# Patient Record
Sex: MALE | Race: WHITE | Employment: FULL TIME | URBAN - METROPOLITAN AREA
[De-identification: names, ages, dates, MRNs, and addresses within clinical notes are randomized per-mention and may not be internally consistent; named-entity substitution may affect disease eponyms.]

---

## 2018-04-16 ENCOUNTER — OFFICE VISIT (OUTPATIENT)
Dept: FAMILY MEDICINE CLINIC | Age: 33
End: 2018-04-16

## 2018-04-16 VITALS
DIASTOLIC BLOOD PRESSURE: 76 MMHG | SYSTOLIC BLOOD PRESSURE: 124 MMHG | WEIGHT: 315 LBS | HEIGHT: 70 IN | BODY MASS INDEX: 45.1 KG/M2 | OXYGEN SATURATION: 99 % | HEART RATE: 89 BPM

## 2018-04-16 DIAGNOSIS — R07.9 CHEST PAIN, UNSPECIFIED TYPE: ICD-10-CM

## 2018-04-16 DIAGNOSIS — Z72.0 TOBACCO ABUSE: Primary | ICD-10-CM

## 2018-04-16 PROCEDURE — 93000 ELECTROCARDIOGRAM COMPLETE: CPT | Performed by: NURSE PRACTITIONER

## 2018-04-16 PROCEDURE — 99203 OFFICE O/P NEW LOW 30 MIN: CPT | Performed by: NURSE PRACTITIONER

## 2018-04-16 ASSESSMENT — ENCOUNTER SYMPTOMS
WHEEZING: 0
SHORTNESS OF BREATH: 0

## 2018-04-18 ENCOUNTER — OFFICE VISIT (OUTPATIENT)
Dept: FAMILY MEDICINE CLINIC | Age: 33
End: 2018-04-18

## 2018-04-18 VITALS
BODY MASS INDEX: 45.1 KG/M2 | WEIGHT: 315 LBS | TEMPERATURE: 99 F | SYSTOLIC BLOOD PRESSURE: 118 MMHG | HEIGHT: 70 IN | DIASTOLIC BLOOD PRESSURE: 80 MMHG | HEART RATE: 80 BPM

## 2018-04-18 DIAGNOSIS — Z00.00 PHYSICAL EXAM: Primary | ICD-10-CM

## 2018-04-18 LAB
A/G RATIO: 1.4 (ref 1.1–2.2)
ALBUMIN SERPL-MCNC: 4.2 G/DL (ref 3.4–5)
ALP BLD-CCNC: 129 U/L (ref 40–129)
ALT SERPL-CCNC: 27 U/L (ref 10–40)
ANION GAP SERPL CALCULATED.3IONS-SCNC: 15 MMOL/L (ref 3–16)
AST SERPL-CCNC: 18 U/L (ref 15–37)
BILIRUB SERPL-MCNC: <0.2 MG/DL (ref 0–1)
BUN BLDV-MCNC: 14 MG/DL (ref 7–20)
CALCIUM SERPL-MCNC: 9.2 MG/DL (ref 8.3–10.6)
CHLORIDE BLD-SCNC: 102 MMOL/L (ref 99–110)
CHOLESTEROL, TOTAL: 221 MG/DL (ref 0–199)
CO2: 24 MMOL/L (ref 21–32)
CREAT SERPL-MCNC: 0.5 MG/DL (ref 0.9–1.3)
GFR AFRICAN AMERICAN: >60
GFR NON-AFRICAN AMERICAN: >60
GLOBULIN: 3.1 G/DL
GLUCOSE BLD-MCNC: 104 MG/DL (ref 70–99)
HDLC SERPL-MCNC: 58 MG/DL (ref 40–60)
LDL CHOLESTEROL CALCULATED: 142 MG/DL
POTASSIUM SERPL-SCNC: 4.6 MMOL/L (ref 3.5–5.1)
SODIUM BLD-SCNC: 141 MMOL/L (ref 136–145)
TOTAL PROTEIN: 7.3 G/DL (ref 6.4–8.2)
TRIGL SERPL-MCNC: 107 MG/DL (ref 0–150)
VLDLC SERPL CALC-MCNC: 21 MG/DL

## 2018-04-18 PROCEDURE — 36415 COLL VENOUS BLD VENIPUNCTURE: CPT | Performed by: NURSE PRACTITIONER

## 2018-04-18 PROCEDURE — 99395 PREV VISIT EST AGE 18-39: CPT | Performed by: NURSE PRACTITIONER

## 2018-04-18 ASSESSMENT — ENCOUNTER SYMPTOMS
DIARRHEA: 0
BLURRED VISION: 0
SHORTNESS OF BREATH: 0
ABDOMINAL PAIN: 0
WHEEZING: 0
EYE REDNESS: 0
CONSTIPATION: 0
COUGH: 0
SORE THROAT: 0

## 2018-04-19 LAB
ESTIMATED AVERAGE GLUCOSE: 108.3 MG/DL
HBA1C MFR BLD: 5.4 %

## 2020-04-29 ENCOUNTER — VIRTUAL VISIT (OUTPATIENT)
Dept: PSYCHOLOGY | Age: 35
End: 2020-04-29
Payer: COMMERCIAL

## 2020-04-29 PROCEDURE — 90791 PSYCH DIAGNOSTIC EVALUATION: CPT | Performed by: PSYCHOLOGIST

## 2020-04-29 NOTE — PROGRESS NOTES
contact's name and number, as well as permission to contact them if needed: Extended Emergency Contact Information  Primary Emergency Contact: Mike-Grade-Allee 19 of [address] [place] [address] Phone: [phone]  Relation: Spouse     Provider location: [address] [place] [address], [address] [place] [address]:  Patient presents with concerns about attention. Sx have been present for most of his adult life. Pt complains of these symptoms of ADHD: fails to give close attention to details or makes careless mistakes in school, work, or other activities, has difficulty sustaining attention in tasks or play activities, does not seem to listen when spoken to directly, has difficulty organizing tasks and activities, does not follow through on instructions and fails to finish schoolwork, chores, or duties in the workplace, loses things that are necessary for tasks and activities, is easily distracted by extraneous stimuli and is often forgetful in daily activities. He describes being distracted by racing thoughts, often worried thoughts about what happened that day, what needs to be done the next day, etc. He has had feedback from his wife and colleagues about his attention and forgetfulness. Sx are aggravated by situational stress. A few years ago, patient's dad passed away suddenly. He lost 5 or 6 other loved ones that year, then his son was born 2 months early and was dx with TTOF. His wife will be home with their son until he goes to , so patient is now the only earner in the family. He is insightful about how these stressors have affected him. Patient finds relief from listening to music or podcasts while at work, using [employer], keeping his hands occupied. Patient lives with his wife of 2 years and their 3year-old son. They have a puppy. Patient works full-time in sales or an insurance company. Daily routine is fairly consistent. Daily caffeine use includes 2-3 cans of Mt. Dew.  Smokes up to 1 ppd (is interested in quitting), drinks

## 2020-05-04 ENCOUNTER — VIRTUAL VISIT (OUTPATIENT)
Dept: PSYCHOLOGY | Age: 35
End: 2020-05-04
Payer: COMMERCIAL

## 2020-05-04 PROCEDURE — 90832 PSYTX W PT 30 MINUTES: CPT | Performed by: PSYCHOLOGIST

## 2020-05-04 NOTE — PROGRESS NOTES
Behavioral Health Consultation  Ranjeet Roque, Ph.D.  Psychologist  5/5/2020  4:12 PM EDT      Time spent with Patient: 25 minutes  This is patient's second [facility] appointment. Reason for Consult:    Chief Complaint   Patient presents with    ADHD    Anxiety     Referring Provider: Jess Vaughn, APRN - CNP  [address] [facility] [address], [address]    Feedback given to PCP. TELEHEALTH VISIT -- Audio/Visual (During MCSOE-60 public health emergency)  }  Pursuant to the emergency declaration under the [facility] waiver authority and the [employer] Act, this Virtual Visit was conducted, with patient's consent, to reduce the patient's risk of exposure to COVID-19 and provide continuity of care for an established patient. Services were provided through a video synchronous discussion virtually to substitute for in-person clinic visit. Pt gave verbal informed consent to participate in telehealth services. Conducted a risk-benefit analysis and determined that the patient's presenting problems are consistent with the use of telepsychology. Determined that the patient has sufficient knowledge and skills in the use of technology enabling them to adequately benefit from telepsychology. It was determined that this patient was able to be properly treated without an in-person session. Patient verified that they were currently located at the [facility] address that was provided during registration.     Verified the following information:  Patient's identification: Yes  Patient location: [address] [place] [address] [address]   Patient's call back number: [phone]   Patient's emergency contact's name and number, as well as permission to contact them if needed: Extended Emergency Contact Information  Primary Emergency Contact: Mike-Grade-Allee 18 of [address] [place] [address] Phone: [phone]  Relation: Spouse worse since his dad . Discussed recommendation to start Wellbutrin. Patient agreeable. Discussed with PCP. O:  MSE:    Appearance: good hygiene   Attitude: cooperative and friendly  Consciousness: alert  Orientation: oriented to person, place, time, general circumstance  Memory: recent and remote memory intact  Attention/Concentration: intact during session  Psychomotor Activity:normal  Eye Contact: normal  Speech: normal rate and volume, well-articulated  Mood: euthymic  Affect: euthymic  Perception: within normal limits  Thought Content: within normal limits  Thought Process: logical, coherent and goal-directed  Insight: good  Judgment: intact  Ability to understand instructions: Yes  Ability to respond meaningfully: Yes  Morbid Ideation: no   Suicide Assessment: no suicidal ideation, plan, or intent  Homicidal Ideation: no    History:    Medications:   No current outpatient medications on file. No current facility-administered medications for this visit.       Social History:   Social History     Socioeconomic History    Marital status:      Spouse name: Not on file    Number of children: Not on file    Years of education: Not on file    Highest education level: Not on file   Occupational History    Not on file   Social Needs    Financial resource strain: Not on file    Food insecurity     Worry: Not on file     Inability: Not on file    Transportation needs     Medical: Not on file     Non-medical: Not on file   Tobacco Use    Smoking status: Current Every Day Smoker     Packs/day: 1.00     Types: Cigarettes    Smokeless tobacco: Never Used   Substance and Sexual Activity    Alcohol use: Yes     Comment: OCCASIONALLY    Drug use: No    Sexual activity: Yes     Partners: Female   Lifestyle    Physical activity     Days per week: Not on file     Minutes per session: Not on file    Stress: Not on file   Relationships    Social connections     Talks on phone: Not on file     Gets together: Not on file     Attends Bahai service: Not on file     Active member of club or organization: Not on file     Attends meetings of clubs or organizations: Not on file     Relationship status: Not on file    Intimate partner violence     Fear of current or ex partner: Not on file     Emotionally abused: Not on file     Physically abused: Not on file     Forced sexual activity: Not on file   Other Topics Concern    Not on file   Social History Narrative    Not on file     TOBACCO:   reports that he has been smoking cigarettes. He has been smoking about 1.00 pack per day. He has never used smokeless tobacco.  ETOH:   reports current alcohol use. Family History:   Family History   Problem Relation Age of Onset    Diabetes Mother     High Blood Pressure Mother     Heart Attack Father     Mental Illness Father     Diabetes Father     Bipolar Disorder Sister     Heart Attack Maternal Grandmother     Heart Attack Maternal Grandfather     Cancer Maternal Grandfather         pancreatic    Stroke Paternal Grandmother     Stroke Paternal Grandfather     Kidney Disease Paternal Grandfather      A:  Patient engaged and cooperative. Denies SI. Insight and motivation are good. Diagnosis:    1. ADHD (attention deficit hyperactivity disorder), inattentive type    2. Anxiety disorder, unspecified type    3. Depression, unspecified depression type      No past medical history on file. Plan:  Pt interventions:  Provided education on the use of medication to treat  depression, stress and ADHD, Conducted functional assessment, [place]-setting to identify pt's primary goals for [facility] visit / overall health, Supportive techniques and Emphasized self-care as important for managing overall health.     Pt Behavioral Change Plan:   See Pt Instructions

## 2020-05-05 ENCOUNTER — TELEPHONE (OUTPATIENT)
Dept: PSYCHOLOGY | Age: 35
End: 2020-05-05

## 2020-05-05 RX ORDER — BUPROPION HYDROCHLORIDE 150 MG/1
150 TABLET ORAL EVERY MORNING
Qty: 30 TABLET | Refills: 3 | Status: SHIPPED | OUTPATIENT
Start: 2020-05-05 | End: 2020-06-04 | Stop reason: ALTCHOICE

## 2020-05-18 ENCOUNTER — VIRTUAL VISIT (OUTPATIENT)
Dept: PSYCHOLOGY | Age: 35
End: 2020-05-18
Payer: COMMERCIAL

## 2020-05-18 PROCEDURE — 90832 PSYTX W PT 30 MINUTES: CPT | Performed by: PSYCHOLOGIST

## 2020-05-18 NOTE — PROGRESS NOTES
Behavioral Health Consultation  Shawn Lassiter, Ph.D.  Psychologist  5/18/2020  3:35 PM EDT      Time spent with Patient: 25 minutes  This is patient's third [facility] appointment. Reason for Consult:    Chief Complaint   Patient presents with    Anxiety    Depression     Referring Provider: KRAIG Shaikh - CNP  [address] [facility] [address], [address]    Feedback given to PCP. TELEHEALTH VISIT -- Audio/Visual (During XEARE-82 public health emergency)  }  Pursuant to the emergency declaration under the [facility], [facility] waiver authority and the [employer] Act, this Virtual Visit was conducted, with patient's consent, to reduce the patient's risk of exposure to COVID-19 and provide continuity of care for an established patient. Services were provided through a video synchronous discussion virtually to substitute for in-person clinic visit. Pt gave verbal informed consent to participate in telehealth services. Conducted a risk-benefit analysis and determined that the patient's presenting problems are consistent with the use of telepsychology. Determined that the patient has sufficient knowledge and skills in the use of technology enabling them to adequately benefit from telepsychology. It was determined that this patient was able to be properly treated without an in-person session. Patient verified that they were currently located at the [facility] address that was provided during registration.     Verified the following information:  Patient's identification: Yes  Patient location: [address] [place] [address]   Patient's call back number: [phone]   Patient's emergency contact's name and number, as well as permission to contact them if needed: Extended Emergency Contact Information  Primary Emergency Contact: Mike-Grade-Allee 18 of [address] [place] [address] Phone: [phone]  Relation: Spouse Medical: Not on file     Non-medical: Not on file   Tobacco Use    Smoking status: Current Every Day Smoker     Packs/day: 1.00     Types: Cigarettes    Smokeless tobacco: Never Used   Substance and Sexual Activity    Alcohol use: Yes     Comment: OCCASIONALLY    Drug use: No    Sexual activity: Yes     Partners: Female   Lifestyle    Physical activity     Days per week: Not on file     Minutes per session: Not on file    Stress: Not on file   Relationships    Social connections     Talks on phone: Not on file     Gets together: Not on file     Attends Congregational service: Not on file     Active member of club or organization: Not on file     Attends meetings of clubs or organizations: Not on file     Relationship status: Not on file    Intimate partner violence     Fear of current or ex partner: Not on file     Emotionally abused: Not on file     Physically abused: Not on file     Forced sexual activity: Not on file   Other Topics Concern    Not on file   Social History Narrative    Not on file     TOBACCO:   reports that he has been smoking cigarettes. He has been smoking about 1.00 pack per day. He has never used smokeless tobacco.  ETOH:   reports current alcohol use. Family History:   Family History   Problem Relation Age of Onset    Diabetes Mother     High Blood Pressure Mother     Heart Attack Father     Mental Illness Father     Diabetes Father     Bipolar Disorder Sister     Heart Attack Maternal Grandmother     Heart Attack Maternal Grandfather     Cancer Maternal Grandfather         pancreatic    Stroke Paternal Grandmother     Stroke Paternal Grandfather     Kidney Disease Paternal Grandfather      A:  Patient engaged and cooperative. Denies SI. Insight and motivation are good. Diagnosis:    1. Anxiety disorder, unspecified type    2. Depression, unspecified depression type    3.  ADHD (attention deficit hyperactivity disorder), inattentive type      No past medical history on file. Plan:  Pt interventions:  Conducted functional assessment, [place]-setting to identify pt's primary goals for [facility] visit / overall health, Supportive techniques, Emphasized self-care as important for managing overall health and Provided Psychoeducation re: mindfulness.     Pt Behavioral Change Plan:   See Pt Instructions

## 2020-06-01 ENCOUNTER — VIRTUAL VISIT (OUTPATIENT)
Dept: PSYCHOLOGY | Age: 35
End: 2020-06-01
Payer: COMMERCIAL

## 2020-06-01 PROCEDURE — 90832 PSYTX W PT 30 MINUTES: CPT | Performed by: PSYCHOLOGIST

## 2020-06-01 NOTE — PROGRESS NOTES
Behavioral Health Consultation  Rhoda Rausch, Ph.D.  Psychologist  6/1/2020  3:44 PM EDT      Time spent with Patient: 25 minutes  This is patient's fourth [facility] appointment. Reason for Consult:    Chief Complaint   Patient presents with    Anxiety    ADHD     Referring Provider: Alan Gasca, KRAIG - CNP  [address] [facility] [address], [address]    Feedback given to PCP. TELEHEALTH VISIT -- Audio/Visual (During DBGTN-33 public health emergency)  }  Pursuant to the emergency declaration under the [facility], [facility] waiver authority and the [employer] and [employer] Act, this Virtual Visit was conducted, with patient's consent, to reduce the patient's risk of exposure to COVID-19 and provide continuity of care for an established patient. Services were provided through a video synchronous discussion virtually to substitute for in-person clinic visit. Pt gave verbal informed consent to participate in telehealth services. Conducted a risk-benefit analysis and determined that the patient's presenting problems are consistent with the use of telepsychology. Determined that the patient has sufficient knowledge and skills in the use of technology enabling them to adequately benefit from telepsychology. It was determined that this patient was able to be properly treated without an in-person session. Patient verified that they were currently located at the [facility] address that was provided during registration.     Verified the following information:  Patient's identification: Yes  Patient location: [address] [place] [address]   Patient's call back number: [phone]   Patient's emergency contact's name and number, as well as permission to contact them if needed: Extended Emergency Contact Information  Primary Emergency Contact: Mike-Grade-Allee [address] of [address] [place] [address] Phone: [phone]  Relation: Spouse Provider location: [address]:  Patient reported that he has continued smoking less cigarettes overall. Smokes just over 1/2 ppd, down from 1 ppd prior to starting Wellbutrin. However, he notices feeling that his anxiety is somewhat worse since last visit. Assessed possibility that stressors have increased, or that depression lessening has highlighted residual anxiety. He notes that his negative thinking has lessened, but he is more irritable. He is also having increased fidgeting and racing thoughts. [facility] will discuss with PCP. O:  MSE:    Appearance: good hygiene   Attitude: cooperative and friendly  Consciousness: alert  Orientation: oriented to person, place, time, general circumstance  Memory: recent and remote memory intact  Attention/Concentration: intact during session  Psychomotor Activity:normal  Eye Contact: normal  Speech: normal rate and volume, well-articulated  Mood: anxious  Affect: anxious  Perception: within normal limits  Thought Content: intrusive thoughts  Thought Process: logical, coherent and goal-directed  Insight: good  Judgment: intact  Ability to understand instructions: Yes  Ability to respond meaningfully: Yes  Morbid Ideation: no   Suicide Assessment: no suicidal ideation, plan, or intent  Homicidal Ideation: no    History:    Medications:   Current Outpatient Medications   Medication Sig Dispense Refill    buPROPion (WELLBUTRIN XL) 150 MG extended release tablet Take 1 tablet by mouth every morning 30 tablet 3     No current facility-administered medications for this visit.       Social History:   Social History     Socioeconomic History    Marital status:      Spouse name: Not on file    Number of children: Not on file    Years of education: Not on file    Highest education level: Not on file   Occupational History    Not on file   Social Needs    Financial resource strain: Not on file    Food insecurity     Worry: Not on file     Inability: Not on file

## 2020-06-02 ENCOUNTER — TELEPHONE (OUTPATIENT)
Dept: PSYCHOLOGY | Age: 35
End: 2020-06-02

## 2020-06-02 NOTE — TELEPHONE ENCOUNTER
Patient notes increased anxiety since starting Wellbutrin. He is struggling with concentration the most. Please let me or him know if you want an appointment to discuss options or if you want to try something else (Strattera or Vyvanse? Or Paxil?).

## 2020-06-04 ENCOUNTER — TELEMEDICINE (OUTPATIENT)
Dept: FAMILY MEDICINE CLINIC | Age: 35
End: 2020-06-04
Payer: COMMERCIAL

## 2020-06-04 PROCEDURE — 99213 OFFICE O/P EST LOW 20 MIN: CPT | Performed by: NURSE PRACTITIONER

## 2020-06-04 ASSESSMENT — ENCOUNTER SYMPTOMS
COUGH: 0
WHEEZING: 0
SHORTNESS OF BREATH: 0

## 2020-06-04 NOTE — PROGRESS NOTES
Temperature-  Pulse oximetry-     Constitutional: [x] Appears well-developed and well-nourished [x] No apparent distress      [] Abnormal-   Mental status  [x] Alert and awake  [x] Oriented to person/place/time [x]Able to follow commands      Eyes:  EOM    []  Normal  [] Abnormal-  Sclera  [x]  Normal  [] Abnormal -         Discharge [x]  None visible  [] Abnormal -    HENT:   [x] Normocephalic, atraumatic. [] Abnormal   [x] Mouth/Throat: Mucous membranes are moist.     External Ears [x] Normal  [] Abnormal-     Neck: [x] No visualized mass     Pulmonary/Chest: [x] Respiratory effort normal.  [x] No visualized signs of difficulty breathing or respiratory distress        [] Abnormal-      Musculoskeletal:   [] Normal gait with no signs of ataxia         [x] Normal range of motion of neck        [] Abnormal-       Neurological:        [] No Facial Asymmetry (Cranial nerve 7 motor function) (limited exam to video visit)          [x] No gaze palsy        [] Abnormal-         Skin:        [x] No significant exanthematous lesions or discoloration noted on facial skin         [] Abnormal-            Psychiatric:       [x] Normal Affect [x] No Hallucinations        [] Abnormal-     Other pertinent observable physical exam findings-     ASSESSMENT/PLAN:  1. Attention deficit hyperactivity disorder (ADHD), unspecified ADHD type    - lisdexamfetamine (VYVANSE) 30 MG capsule; Take 1 capsule by mouth every morning for 30 days. Dispense: 30 capsule; Refill: 0  Continue to see Dr. Sharif Esquivel for mood      Return in about 1 month (around 7/4/2020) for f/u ADHD. Vincent Mireles is a 36 y.o. male being evaluated by a Virtual Visit (video visit) encounter to address concerns as mentioned above. A caregiver was present when appropriate.  Due to this being a TeleHealth encounter (During GEQSX-43 public health emergency), evaluation of the following organ systems was limited:

## 2020-06-05 ENCOUNTER — TELEPHONE (OUTPATIENT)
Dept: FAMILY MEDICINE CLINIC | Age: 35
End: 2020-06-05

## 2020-06-06 RX ORDER — METHYLPHENIDATE HYDROCHLORIDE 20 MG/1
20 CAPSULE, EXTENDED RELEASE ORAL EVERY MORNING
Qty: 30 CAPSULE | Refills: 0 | Status: SHIPPED | OUTPATIENT
Start: 2020-06-06 | End: 2020-06-08

## 2020-06-08 ENCOUNTER — TELEPHONE (OUTPATIENT)
Dept: FAMILY MEDICINE CLINIC | Age: 35
End: 2020-06-08

## 2020-06-09 ENCOUNTER — TELEPHONE (OUTPATIENT)
Dept: FAMILY MEDICINE CLINIC | Age: 35
End: 2020-06-09

## 2020-06-15 ENCOUNTER — VIRTUAL VISIT (OUTPATIENT)
Dept: PSYCHOLOGY | Age: 35
End: 2020-06-15
Payer: COMMERCIAL

## 2020-06-15 PROCEDURE — 90832 PSYTX W PT 30 MINUTES: CPT | Performed by: PSYCHOLOGIST

## 2020-06-15 NOTE — PROGRESS NOTES
Provider location: [place]:  Patient notes significant improvement in his focus and memory since starting Vyvanse last week. He notes improvement at work, getting more done, not missing as many details. Wife notes that he is more \"present,\" less forgetful. Timothy Fields has commented on improved note taking at work. Doses at 9:30 am, with breakfast including iced coffee  Wears off around 6:00, notices difficulty concentrating and headache (relieved with Excedrin)  Eats dinner around 7:30  Reduced appetite  Sleep is not disturbed. Drinking less caffeine  Smoking slightly less cigarettes (1/4 less PPD)  Easier to participate in grounding exercises     Patient did use timers to help him remember to look back over his notes. Overall, he is doing well. Recommended scheduling a snack in the afternoon to help with PM headaches. Will f/u in 1 month. O:  MSE:    Appearance: good hygiene   Attitude: cooperative and friendly  Consciousness: alert  Orientation: oriented to person, place, time, general circumstance  Memory: recent and remote memory intact  Attention/Concentration: intact during session  Psychomotor Activity:normal  Eye Contact: normal  Speech: normal rate and volume, well-articulated  Mood: good  Affect: euthymic  Perception: within normal limits  Thought Content: within normal limits  Thought Process: logical, coherent and goal-directed  Insight: good  Judgment: intact  Ability to understand instructions: Yes  Ability to respond meaningfully: Yes  Morbid Ideation: no   Suicide Assessment: no suicidal ideation, plan, or intent  Homicidal Ideation: no    History:    Medications:   Current Outpatient Medications   Medication Sig Dispense Refill    lisdexamfetamine (VYVANSE) 30 MG capsule Take 1 capsule by mouth every morning for 30 days. 30 capsule 0     No current facility-administered medications for this visit.       Social History:   Social History     Socioeconomic History    Marital Paternal Grandfather     Kidney Disease Paternal Grandfather      A:  Patient engaged and cooperative. Denies SI. Insight and motivation are good. Diagnosis:    1. Depression, unspecified depression type    2. Anxiety disorder, unspecified type    3. ADHD (attention deficit hyperactivity disorder), inattentive type          Diagnosis Date    ADHD      Plan:  Pt interventions:  Conducted functional assessment, [place]-setting to identify pt's primary goals for [facility] visit / overall health, Supportive techniques, Emphasized self-care as important for managing overall health and Problem-solving re: new stimulant.     Pt Behavioral Change Plan:   See Pt Instructions

## 2020-06-15 NOTE — PATIENT INSTRUCTIONS
1. Schedule a snack in the afternoon. Consider a high-protein snack that is easily portable. Look for protein bars that don't have too much sugar, or fill snack bags with nuts or trail mix. Have a glass of water with your snack. 2. Keep up the good work, and I'll see you in 1 month!

## 2020-07-02 ENCOUNTER — TELEMEDICINE (OUTPATIENT)
Dept: FAMILY MEDICINE CLINIC | Age: 35
End: 2020-07-02
Payer: COMMERCIAL

## 2020-07-02 PROCEDURE — 99212 OFFICE O/P EST SF 10 MIN: CPT | Performed by: NURSE PRACTITIONER

## 2020-07-02 ASSESSMENT — ENCOUNTER SYMPTOMS
WHEEZING: 0
SHORTNESS OF BREATH: 0

## 2020-07-02 NOTE — PROGRESS NOTES
2020    TELEHEALTH EVALUATION -- Audio/Visual (During QXTWN-95 public health emergency)    HPI:    Maximus Farr (:  1985) has requested an audio/video evaluation for the following concern(s):    ADHD: Started vyvanse 30mg a month ago- felt like it worked a little the first couple weeks but now does not seem to help much- would like to try higher dose. Denies any trouble with heart palpitations or sleeping. Has affected appetite a little but patient could lose some weight as well. Review of Systems   Constitutional: Negative for activity change, appetite change and unexpected weight change. Respiratory: Negative for shortness of breath and wheezing. Cardiovascular: Negative for chest pain and palpitations. Neurological: Negative for dizziness and headaches. Psychiatric/Behavioral: Positive for decreased concentration. Prior to Visit Medications    Medication Sig Taking? Authorizing Provider   lisdexamfetamine (VYVANSE) 50 MG capsule Take 1 capsule by mouth every morning for 30 days.  Yes Silviano Donahue APRN - CNP       Social History     Tobacco Use    Smoking status: Current Every Day Smoker     Packs/day: 1.00     Types: Cigarettes    Smokeless tobacco: Never Used   Substance Use Topics    Alcohol use: Yes     Comment: OCCASIONALLY    Drug use: No        No Known Allergies,   Past Medical History:   Diagnosis Date    ADHD    ,   Past Surgical History:   Procedure Laterality Date    APPENDECTOMY      TYMPANOSTOMY TUBE PLACEMENT     ,   Family History   Problem Relation Age of Onset    Diabetes Mother     High Blood Pressure Mother     Heart Attack Father     Mental Illness Father     Diabetes Father     Bipolar Disorder Sister     Heart Attack Maternal Grandmother     Heart Attack Maternal Grandfather     Cancer Maternal Grandfather         pancreatic    Stroke Paternal Grandmother     Stroke Paternal Grandfather     Kidney Disease Paternal Grandfather        PHYSICAL EXAMINATION:  [ INSTRUCTIONS:  \"[x]\" Indicates a positive item  \"[]\" Indicates a negative item  -- DELETE ALL ITEMS NOT EXAMINED]  Vital Signs: (As obtained by patient/caregiver or practitioner observation)    Blood pressure-  Heart rate-    Respiratory rate-    Temperature-  Pulse oximetry-     Constitutional: [x] Appears well-developed and well-nourished [x] No apparent distress      [] Abnormal-   Mental status  [x] Alert and awake  [x] Oriented to person/place/time [x]Able to follow commands      Eyes:  EOM    []  Normal  [] Abnormal-  Sclera  [x]  Normal  [] Abnormal -         Discharge [x]  None visible  [] Abnormal -    HENT:   [x] Normocephalic, atraumatic. [] Abnormal   [x] Mouth/Throat: Mucous membranes are moist.     External Ears [x] Normal  [] Abnormal-     Neck: [x] No visualized mass     Pulmonary/Chest: [x] Respiratory effort normal.  [x] No visualized signs of difficulty breathing or respiratory distress        [] Abnormal-      Musculoskeletal:   [] Normal gait with no signs of ataxia         [x] Normal range of motion of neck        [] Abnormal-       Neurological:        [x] No Facial Asymmetry (Cranial nerve 7 motor function) (limited exam to video visit)          [x] No gaze palsy        [] Abnormal-         Skin:        [x] No significant exanthematous lesions or discoloration noted on facial skin         [] Abnormal-            Psychiatric:       [x] Normal Affect [x] No Hallucinations        [] Abnormal-     Other pertinent observable physical exam findings-     ASSESSMENT/PLAN:  1. Attention deficit hyperactivity disorder (ADHD), unspecified ADHD type  Increased dose  - lisdexamfetamine (VYVANSE) 50 MG capsule; Take 1 capsule by mouth every morning for 30 days. Dispense: 30 capsule; Refill: 0      Return in about 1 month (around 8/2/2020) for f/u ADHD.     Filomena Payne is a 36 y.o. male being evaluated by a Virtual Visit (video visit) encounter to address concerns as mentioned above. A caregiver was present when appropriate. Due to this being a TeleHealth encounter (During DVUDM-65 public health emergency), evaluation of the following organ systems was limited: Vitals/Constitutional/EENT/Resp/CV/GI//MS/Neuro/Skin/Heme-Lymph-Imm. Pursuant to the emergency declaration under the [address] [place] [address], [address] [place] [address] authority and the [employer] and [employer] Act, this Virtual Visit was conducted with patient's (and/or legal guardian's) consent, to reduce the patient's risk of exposure to COVID-19 and provide necessary medical care. The patient (and/or legal guardian) has also been advised to contact this office for worsening conditions or problems, and seek emergency medical treatment and/or call 911 if deemed necessary. Patient identification was verified at the start of the visit: Yes    Total time spent on this encounter: Not billed by time    Services were provided through a video synchronous discussion virtually to substitute for in-person clinic visit. Patient and provider were located at their individual homes. --KRAIG Luna - CNP on 7/2/2020 at 3:18 PM    An electronic signature was used to authenticate this note.

## 2020-07-13 ENCOUNTER — VIRTUAL VISIT (OUTPATIENT)
Dept: PSYCHOLOGY | Age: 35
End: 2020-07-13
Payer: COMMERCIAL

## 2020-07-13 PROCEDURE — 90832 PSYTX W PT 30 MINUTES: CPT | Performed by: PSYCHOLOGIST

## 2020-07-13 NOTE — PATIENT INSTRUCTIONS
Time-Out Guidelines for Couples    1. Either partner can call a time-out during a discussion utilizing a pre-agreed upon signal.    2. Use an I statement to let your partner know that you want to stop the discussion. 3. The other partner agrees to respect the cue that the discussion is escalating. 4. A time-out needs to last long enough for each partner to calm sufficiently. Many couples have discovered that an hour works well. 5. During the time-out, both partners agree to utilize calming techniques. 6. The partner who initiated the time-out should reinitiate the discussion upon returning with both agreeing to use safer more effective ways to communicate. 7. If either partner is not ready to return to the discussion after the time-out, he/she can decline politely. 8. If declining, the partner must agree to a time within 24 hours that he/she will return to the discussion.

## 2020-07-13 NOTE — PROGRESS NOTES
Behavioral Health Consultation  Noah Talavera, Ph.D.  Psychologist  7/13/2020  3:38 PM EDT      Time spent with Patient: 25 minutes  This is patient's sixth [facility] appointment. Reason for Consult:    Chief Complaint   Patient presents with    ADHD    Anxiety     Referring Provider: KRAIG De Paz - PHONG  [address] [facility] [address], [address]    Feedback given to PCP. TELEHEALTH VISIT -- Audio/Visual (During GCOYX-56 public health emergency)  }  Pursuant to the emergency declaration under the [facility], [facility] waiver authority and the [employer] and [employer] Act, this Virtual Visit was conducted, with patient's consent, to reduce the patient's risk of exposure to COVID-19 and provide continuity of care for an established patient. Services were provided through a video synchronous discussion virtually to substitute for in-person clinic visit. Pt gave verbal informed consent to participate in telehealth services. Conducted a risk-benefit analysis and determined that the patient's presenting problems are consistent with the use of telepsychology. Determined that the patient has sufficient knowledge and skills in the use of technology enabling them to adequately benefit from telepsychology. It was determined that this patient was able to be properly treated without an in-person session. Patient verified that they were currently located at the [facility] address that was provided during registration.     Verified the following information:  Patient's identification: Yes  Patient location: [address] [place] [address]   Patient's call back number: [phone]   Patient's emergency contact's name and number, as well as permission to contact them if needed: Extended Emergency Contact Information  Primary Emergency Contact: Mike-Grade-Allee [address] of [address] [place] [address] Phone: [phone]  Relation: Spouse Provider location: [place]wyn Layman:  Patient continues to note improvement in his focus that he attributes to Vyvanse. Denies problematic side effects. Does note decreased appetite, but has been eating 3 times a day. Notes having one of his best months at work in a long time. Also feels able to calm himself down when stressful. He feels he has started processing his father's death more. A good friend passed away a few weeks ago, which was hard for him. Discussed his questions about communicating, especially when emotional. Encouraged continued use of mindfulness strategies and reviewed communication time out. O:  MSE:    Appearance: good hygiene   Attitude: cooperative and friendly  Consciousness: alert  Orientation: oriented to person, place, time, general circumstance  Memory: recent and remote memory intact  Attention/Concentration: intact during session  Psychomotor Activity:normal  Eye Contact: normal  Speech: normal rate and volume, well-articulated  Mood: good  Affect: euthymic  Perception: within normal limits  Thought Content: all-or-none thinking and intrusive thoughts  Thought Process: logical, coherent and goal-directed  Insight: good  Judgment: intact  Ability to understand instructions: Yes  Ability to respond meaningfully: Yes  Morbid Ideation: no   Suicide Assessment: no suicidal ideation, plan, or intent  Homicidal Ideation: no    History:    Medications:   Current Outpatient Medications   Medication Sig Dispense Refill    lisdexamfetamine (VYVANSE) 50 MG capsule Take 1 capsule by mouth every morning for 30 days. 30 capsule 0     No current facility-administered medications for this visit.       Social History:   Social History     Socioeconomic History    Marital status:      Spouse name: Not on file    Number of children: Not on file    Years of education: Not on file    Highest education level: Not on file   Occupational History    Not on file   Social Needs    Financial disorder, unspecified type    3. ADHD (attention deficit hyperactivity disorder), inattentive type          Diagnosis Date    ADHD      Plan:  Pt interventions:  Trained in improving communication skills, Conducted functional assessment, [place]-setting to identify pt's primary goals for [facility] visit / overall health, Supportive techniques, Emphasized self-care as important for managing overall health and Identified maladaptive thoughts.     Pt Behavioral Change Plan:   See Pt Instructions

## 2020-08-03 ENCOUNTER — VIRTUAL VISIT (OUTPATIENT)
Dept: FAMILY MEDICINE CLINIC | Age: 35
End: 2020-08-03
Payer: COMMERCIAL

## 2020-08-03 ENCOUNTER — VIRTUAL VISIT (OUTPATIENT)
Dept: PSYCHOLOGY | Age: 35
End: 2020-08-03
Payer: COMMERCIAL

## 2020-08-03 PROCEDURE — 90832 PSYTX W PT 30 MINUTES: CPT | Performed by: PSYCHOLOGIST

## 2020-08-03 PROCEDURE — 99212 OFFICE O/P EST SF 10 MIN: CPT | Performed by: NURSE PRACTITIONER

## 2020-08-03 ASSESSMENT — ENCOUNTER SYMPTOMS: SHORTNESS OF BREATH: 0

## 2020-08-03 NOTE — PROGRESS NOTES
Behavioral Health Consultation  Faizan Antony, Ph.D.  Psychologist  8/3/2020  4:03 PM EDT      Time spent with Patient: 20 minutes  This is patient's seventh [facility] appointment. Reason for Consult:    Chief Complaint   Patient presents with    ADHD    Anxiety     Referring Provider: Mikel López, APRN - CNP  [address] [facility] [address], [address]    Feedback given to PCP. TELEHEALTH VISIT -- Audio/Visual (During DVTNV-25 public health emergency)  }  Pursuant to the emergency declaration under the [facility], [facility] waiver authority and the [email or web address] and [employer] Act, this Virtual Visit was conducted, with patient's consent, to reduce the patient's risk of exposure to COVID-19 and provide continuity of care for an established patient. Services were provided through a video synchronous discussion virtually to substitute for in-person clinic visit. Pt gave verbal informed consent to participate in telehealth services. Conducted a risk-benefit analysis and determined that the patient's presenting problems are consistent with the use of telepsychology. Determined that the patient has sufficient knowledge and skills in the use of technology enabling them to adequately benefit from telepsychology. It was determined that this patient was able to be properly treated without an in-person session. Patient verified that they were currently located at the [facility] address that was provided during registration.     Verified the following information:  Patient's identification: Yes  Patient location: [address] [place] [address]   Patient's call back number: [phone]   Patient's emergency contact's name and number, as well as permission to contact them if needed: Extended Emergency Contact Information  Primary Emergency Contact: Mike-Grade-Allee [address] of [address] [place] [address] Phone: [phone]  Relation: Spouse Provider location: [address]:  Patient noted that his stress has been stable since last visit. Continues to experience benefit from Vyvanse, work is going very well. He has experienced a few instances of shorter temper, being more irritable with his wife. These often occur at the end of the day. Patient is insightful that he is also trying to communicate more with her in general, which increases opportunities to disagree. Discussed progress in treatment. Patient describes 75-80% improvement. \"I feel like things are heading in the right direction. \" Agreed to follow-up at monthly intervals for maintenance visits. O:  MSE:    Appearance: good hygiene   Attitude: cooperative and friendly  Consciousness: alert  Orientation: oriented to person, place, time, general circumstance  Memory: recent and remote memory intact  Attention/Concentration: intact during session  Psychomotor Activity:normal  Eye Contact: normal  Speech: normal rate and volume, well-articulated  Mood: \"busy\"  Affect: euthymic  Perception: within normal limits  Thought Content: intrusive thoughts  Thought Process: logical, coherent and goal-directed  Insight: good  Judgment: intact  Ability to understand instructions: Yes  Ability to respond meaningfully: Yes  Morbid Ideation: no   Suicide Assessment: no suicidal ideation, plan, or intent  Homicidal Ideation: no    History:    Medications:   Current Outpatient Medications   Medication Sig Dispense Refill    lisdexamfetamine (VYVANSE) 50 MG capsule Take 1 capsule by mouth every morning for 30 days. 30 capsule 0     No current facility-administered medications for this visit.       Social History:   Social History     Socioeconomic History    Marital status:      Spouse name: Not on file    Number of children: Not on file    Years of education: Not on file    Highest education level: Not on file   Occupational History    Not on file   Social Needs    Financial resource strain: Not on file    Food insecurity     Worry: Not on file     Inability: Not on file    Transportation needs     Medical: Not on file     Non-medical: Not on file   Tobacco Use    Smoking status: Current Every Day Smoker     Packs/day: 1.00     Types: Cigarettes    Smokeless tobacco: Never Used   Substance and Sexual Activity    Alcohol use: Yes     Comment: OCCASIONALLY    Drug use: No    Sexual activity: Yes     Partners: Female   Lifestyle    Physical activity     Days per week: Not on file     Minutes per session: Not on file    Stress: Not on file   Relationships    Social connections     Talks on phone: Not on file     Gets together: Not on file     Attends Holiness service: Not on file     Active member of club or organization: Not on file     Attends meetings of clubs or organizations: Not on file     Relationship status: Not on file    Intimate partner violence     Fear of current or ex partner: Not on file     Emotionally abused: Not on file     Physically abused: Not on file     Forced sexual activity: Not on file   Other Topics Concern    Not on file   Social History Narrative    Not on file     TOBACCO:   reports that he has been smoking cigarettes. He has been smoking about 1.00 pack per day. He has never used smokeless tobacco.  ETOH:   reports current alcohol use. Family History:   Family History   Problem Relation Age of Onset    Diabetes Mother     High Blood Pressure Mother     Heart Attack Father     Mental Illness Father     Diabetes Father     Bipolar Disorder Sister     Heart Attack Maternal Grandmother     Heart Attack Maternal Grandfather     Cancer Maternal Grandfather         pancreatic    Stroke Paternal Grandmother     Stroke Paternal Grandfather     Kidney Disease Paternal Grandfather      A:  Patient engaged and cooperative. Denies SI. Insight and motivation are good. Diagnosis:    1. Depression, unspecified depression type    2.  ADHD (attention deficit hyperactivity disorder), inattentive type    3. Anxiety disorder, unspecified type          Diagnosis Date    ADHD      Plan:  Pt interventions:  Trained in improving communication skills, Conducted functional assessment, [place]-setting to identify pt's primary goals for [facility] visit / overall health, Supportive techniques, Emphasized self-care as important for managing overall health and Collaboratively set goals with pt re: maintaining progress in treatment.     Pt Behavioral Change Plan:   See Pt Instructions

## 2020-08-03 NOTE — PROGRESS NOTES
8/3/2020    TELEHEALTH EVALUATION -- Audio/Visual (During ANSQL-59 public health emergency)    HPI:    Louie Day (:  1985) has requested an audio/video evaluation for the following concern(s):    Med check for vyvanse 50mg was increased last month-   Working well- staying more focused- no headaches like he was at the beginning. Wears off around 4pm-5pm  Eating well    Review of Systems   Constitutional: Negative for activity change, appetite change and fever. Respiratory: Negative for shortness of breath. Cardiovascular: Negative for chest pain and palpitations. Neurological: Negative for headaches. Psychiatric/Behavioral: Negative for decreased concentration (meds helping) and sleep disturbance. Prior to Visit Medications    Medication Sig Taking? Authorizing Provider   lisdexamfetamine (VYVANSE) 50 MG capsule Take 1 capsule by mouth every morning for 30 days.  Yes Ron Real APRN - CNP       Social History     Tobacco Use    Smoking status: Current Every Day Smoker     Packs/day: 1.00     Types: Cigarettes    Smokeless tobacco: Never Used   Substance Use Topics    Alcohol use: Yes     Comment: OCCASIONALLY    Drug use: No        No Known Allergies,   Past Medical History:   Diagnosis Date    ADHD    ,   Past Surgical History:   Procedure Laterality Date    APPENDECTOMY      TYMPANOSTOMY TUBE PLACEMENT     ,   Family History   Problem Relation Age of Onset    Diabetes Mother     High Blood Pressure Mother     Heart Attack Father     Mental Illness Father     Diabetes Father     Bipolar Disorder Sister     Heart Attack Maternal Grandmother     Heart Attack Maternal Grandfather     Cancer Maternal Grandfather         pancreatic    Stroke Paternal Grandmother     Stroke Paternal Grandfather     Kidney Disease Paternal Grandfather        PHYSICAL EXAMINATION:  [ INSTRUCTIONS:  \"[x]\" Indicates a positive item  \"[]\" Indicates a negative item  -- DELETE ALL

## 2020-08-03 NOTE — PATIENT INSTRUCTIONS
1. Consider scheduling \"staff meetings\" with [place] once a week. Spend this time to trouble-shoot management of tasks around the house. You may choose to create an agenda for these meetings ahead of time so you can both come prepared. 2. Your next appointment with Dr. Krzysztof Jimenez is in 4 weeks.

## 2020-09-21 ENCOUNTER — OFFICE VISIT (OUTPATIENT)
Dept: PSYCHOLOGY | Age: 35
End: 2020-09-21
Payer: COMMERCIAL

## 2020-09-21 PROCEDURE — 90832 PSYTX W PT 30 MINUTES: CPT | Performed by: PSYCHOLOGIST

## 2020-09-21 ASSESSMENT — PATIENT HEALTH QUESTIONNAIRE - PHQ9
SUM OF ALL RESPONSES TO PHQ QUESTIONS 1-9: 2
SUM OF ALL RESPONSES TO PHQ9 QUESTIONS 1 & 2: 2
SUM OF ALL RESPONSES TO PHQ QUESTIONS 1-9: 2
1. LITTLE INTEREST OR PLEASURE IN DOING THINGS: 1
2. FEELING DOWN, DEPRESSED OR HOPELESS: 1

## 2020-09-21 ASSESSMENT — ANXIETY QUESTIONNAIRES
4. TROUBLE RELAXING: 0-NOT AT ALL
2. NOT BEING ABLE TO STOP OR CONTROL WORRYING: 0-NOT AT ALL
GAD7 TOTAL SCORE: 4
3. WORRYING TOO MUCH ABOUT DIFFERENT THINGS: 1-SEVERAL DAYS
7. FEELING AFRAID AS IF SOMETHING AWFUL MIGHT HAPPEN: 1-SEVERAL DAYS
1. FEELING NERVOUS, ANXIOUS, OR ON EDGE: 0-NOT AT ALL
6. BECOMING EASILY ANNOYED OR IRRITABLE: 2-OVER HALF THE DAYS
5. BEING SO RESTLESS THAT IT IS HARD TO SIT STILL: 0-NOT AT ALL
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
## 2020-10-19 ENCOUNTER — OFFICE VISIT (OUTPATIENT)
Dept: PSYCHOLOGY | Age: 35
End: 2020-10-19
Payer: COMMERCIAL

## 2020-10-19 VITALS — TEMPERATURE: 97.3 F

## 2020-10-19 PROCEDURE — 90832 PSYTX W PT 30 MINUTES: CPT | Performed by: PSYCHOLOGIST

## 2020-10-19 NOTE — PROGRESS NOTES
Behavioral Health Consultation  Haroldo Yarbrough, Ph.D.  Psychologist  10/19/2020  1:10 PM EDT      Time spent with Patient: 20 minutes  This is patient's ninth  [facility] appointment. Reason for Consult:    Chief Complaint   Patient presents with    Anxiety     Referring Provider: Chiki Hicks, KRAIG - CNP  [address] [address] [address] [facility] [place], [address] [place] [address]    Feedback given to PCP. S:  Patient reported that his mood has been stable since last visit. Notes that stressors from last month have largely resolved. He has continued to manage his temper more appropriately, also communicates more clearly with his wife. They have been getting along well, which also contributes to his improved mood. He does report that he has been having more difficulty concentrating at work, especially in the afternoon. Reviewed behavioral strategies to help extend benefit of his Vyvanse (exercise, nutrition, avoiding citrus). O:  MSE:    Appearance: good hygiene   Attitude: cooperative and friendly  Consciousness: alert  Orientation: oriented to person, place, time, general circumstance  Memory: recent and remote memory intact  Attention/Concentration: intact during session  Psychomotor Activity:normal  Eye Contact: normal  Speech: normal rate and volume, well-articulated  Mood: calm  Affect: euthymic  Perception: within normal limits  Thought Content: within normal limits  Thought Process: logical, coherent and goal-directed  Insight: good  Judgment: intact  Ability to understand instructions: Yes  Ability to respond meaningfully: Yes  Morbid Ideation: no   Suicide Assessment: no suicidal ideation, plan, or intent  Homicidal Ideation: no     History:    Medications:   Current Outpatient Medications   Medication Sig Dispense Refill    lisdexamfetamine (VYVANSE) 50 MG capsule Take 1 capsule by mouth every morning for 30 days. 30 capsule 0     No current facility-administered medications for this visit.       Social History:   Social History Socioeconomic History    Marital status:      Spouse name: Not on file    Number of children: Not on file    Years of education: Not on file    Highest education level: Not on file   Occupational History    Not on file   Social Needs    Financial resource strain: Not on file    Food insecurity     Worry: Not on file     Inability: Not on file    Transportation needs     Medical: Not on file     Non-medical: Not on file   Tobacco Use    Smoking status: Current Every Day Smoker     Packs/day: 1.00     Types: Cigarettes    Smokeless tobacco: Never Used   Substance and Sexual Activity    Alcohol use: Yes     Comment: OCCASIONALLY    Drug use: No    Sexual activity: Yes     Partners: Female   Lifestyle    Physical activity     Days per week: Not on file     Minutes per session: Not on file    Stress: Not on file   Relationships    Social connections     Talks on phone: Not on file     Gets together: Not on file     Attends Scientologist service: Not on file     Active member of club or organization: Not on file     Attends meetings of clubs or organizations: Not on file     Relationship status: Not on file    Intimate partner violence     Fear of current or ex partner: Not on file     Emotionally abused: Not on file     Physically abused: Not on file     Forced sexual activity: Not on file   Other Topics Concern    Not on file   Social History Narrative    Not on file     TOBACCO:   reports that he has been smoking cigarettes. He has been smoking about 1.00 pack per day. He has never used smokeless tobacco.  ETOH:   reports current alcohol use.   Family History:   Family History   Problem Relation Age of Onset    Diabetes Mother     High Blood Pressure Mother     Heart Attack Father     Mental Illness Father     Diabetes Father     Bipolar Disorder Sister     Heart Attack Maternal Grandmother     Heart Attack Maternal Grandfather     Cancer Maternal Grandfather         pancreatic    Stroke Paternal Grandmother     Stroke Paternal Grandfather     Kidney Disease Paternal Grandfather      A:  Patient engaged and cooperative. Denies SI. Insight and motivation are good. Diagnosis:    1. Depression, unspecified depression type    2. Anxiety disorder, unspecified type    3. ADHD (attention deficit hyperactivity disorder), inattentive type          Diagnosis Date    ADHD      Plan:  Pt interventions:  Conducted functional assessment, [place]-setting to identify pt's primary goals for [facility] visit / overall health, Supportive techniques, Emphasized self-care as important for managing overall health and Problem-solving re: mid-afternoon concentration problems.     Pt Behavioral Change Plan:   See Pt Instructions

## 2020-10-19 NOTE — PATIENT INSTRUCTIONS
1. When you notice your concentration dips at work, try taking a break to move your body, have a snack, or have a small amount of sugar (juice or Gatorade). 2. Return to see Dr. Shankar Spann in 6 weeks.

## 2020-11-30 ENCOUNTER — OFFICE VISIT (OUTPATIENT)
Dept: PSYCHOLOGY | Age: 35
End: 2020-11-30
Payer: COMMERCIAL

## 2020-11-30 ENCOUNTER — OFFICE VISIT (OUTPATIENT)
Dept: FAMILY MEDICINE CLINIC | Age: 35
End: 2020-11-30
Payer: COMMERCIAL

## 2020-11-30 VITALS
SYSTOLIC BLOOD PRESSURE: 118 MMHG | WEIGHT: 298 LBS | HEART RATE: 100 BPM | HEIGHT: 68 IN | TEMPERATURE: 97.5 F | OXYGEN SATURATION: 98 % | BODY MASS INDEX: 45.16 KG/M2 | DIASTOLIC BLOOD PRESSURE: 72 MMHG

## 2020-11-30 PROCEDURE — 90471 IMMUNIZATION ADMIN: CPT | Performed by: NURSE PRACTITIONER

## 2020-11-30 PROCEDURE — 99213 OFFICE O/P EST LOW 20 MIN: CPT | Performed by: NURSE PRACTITIONER

## 2020-11-30 PROCEDURE — 90832 PSYTX W PT 30 MINUTES: CPT | Performed by: PSYCHOLOGIST

## 2020-11-30 PROCEDURE — 90686 IIV4 VACC NO PRSV 0.5 ML IM: CPT | Performed by: NURSE PRACTITIONER

## 2020-11-30 ASSESSMENT — ANXIETY QUESTIONNAIRES
3. WORRYING TOO MUCH ABOUT DIFFERENT THINGS: 0-NOT AT ALL
2. NOT BEING ABLE TO STOP OR CONTROL WORRYING: 0-NOT AT ALL
GAD7 TOTAL SCORE: 2
7. FEELING AFRAID AS IF SOMETHING AWFUL MIGHT HAPPEN: 0-NOT AT ALL
5. BEING SO RESTLESS THAT IT IS HARD TO SIT STILL: 0-NOT AT ALL
1. FEELING NERVOUS, ANXIOUS, OR ON EDGE: 0-NOT AT ALL
6. BECOMING EASILY ANNOYED OR IRRITABLE: 1-SEVERAL DAYS
4. TROUBLE RELAXING: 1-SEVERAL DAYS

## 2020-11-30 ASSESSMENT — PATIENT HEALTH QUESTIONNAIRE - PHQ9
SUM OF ALL RESPONSES TO PHQ QUESTIONS 1-9: 1
SUM OF ALL RESPONSES TO PHQ QUESTIONS 1-9: 1
1. LITTLE INTEREST OR PLEASURE IN DOING THINGS: 1
SUM OF ALL RESPONSES TO PHQ QUESTIONS 1-9: 1
SUM OF ALL RESPONSES TO PHQ9 QUESTIONS 1 & 2: 1
2. FEELING DOWN, DEPRESSED OR HOPELESS: 0

## 2020-11-30 ASSESSMENT — ENCOUNTER SYMPTOMS
WHEEZING: 0
SHORTNESS OF BREATH: 0

## 2020-11-30 NOTE — PATIENT INSTRUCTIONS
Return to see Dr. Gabriela Edwards in the future, if needed. Pay attention to sleep, appetite, and mood.

## 2020-11-30 NOTE — PROGRESS NOTES
Behavioral Health Consultation  Nena Torrez, Ph.D.  Psychologist  11/30/2020  1:35 PM EST      Time spent with Patient: 20 minutes  This is patient's tenth  [facility] appointment. Reason for Consult:    Chief Complaint   Patient presents with    Anxiety     Referring Provider: Saroj Herbert, APRN - CNP  [address] [place] [address] [address] [employer] [place], [address]    Feedback given to PCP. S:  Patient continues to experience improved mood, stress tolerance. \"I\"m in a better headspace, so it's easier to deal with\" stress that does occur. He has been using behavioral strategies to manage concentration deficits in the afternoon (snacks, exercise, more frequent breaks). He feels confident about managing his mood and concentration. Plan to f/u PRN. O:  MSE:    Appearance: good hygiene   Attitude: cooperative and friendly  Consciousness: alert  Orientation: oriented to person, place, time, general circumstance  Memory: recent and remote memory intact  Attention/Concentration: intact during session  Psychomotor Activity:normal  Eye Contact: normal  Speech: normal rate and volume, well-articulated  Mood: calm, stable  Affect: euthymic  Perception: within normal limits  Thought Content: within normal limits  Thought Process: logical, coherent and goal-directed  Insight: good  Judgment: intact  Ability to understand instructions: Yes  Ability to respond meaningfully: Yes  Morbid Ideation: no   Suicide Assessment: no suicidal ideation, plan, or intent  Homicidal Ideation: no     History:    Medications:   Current Outpatient Medications   Medication Sig Dispense Refill    lisdexamfetamine 60 MG CAPS Take 60 mg by mouth every morning for 30 days. 30 capsule 0     No current facility-administered medications for this visit.       Social History:   Social History     Socioeconomic History    Marital status:      Spouse name: Not on file    Number of children: Not on file    Years of education: Not on file    Highest education level: Not on file   Occupational History    Not on file   Social Needs    Financial resource strain: Not on file    Food insecurity     Worry: Not on file     Inability: Not on file    Transportation needs     Medical: Not on file     Non-medical: Not on file   Tobacco Use    Smoking status: Current Every Day Smoker     Packs/day: 1.00     Types: Cigarettes    Smokeless tobacco: Never Used   Substance and Sexual Activity    Alcohol use: Yes     Comment: OCCASIONALLY    Drug use: No    Sexual activity: Yes     Partners: Female   Lifestyle    Physical activity     Days per week: Not on file     Minutes per session: Not on file    Stress: Not on file   Relationships    Social connections     Talks on phone: Not on file     Gets together: Not on file     Attends Yazidism service: Not on file     Active member of club or organization: Not on file     Attends meetings of clubs or organizations: Not on file     Relationship status: Not on file    Intimate partner violence     Fear of current or ex partner: Not on file     Emotionally abused: Not on file     Physically abused: Not on file     Forced sexual activity: Not on file   Other Topics Concern    Not on file   Social History Narrative    Not on file     TOBACCO:   reports that he has been smoking cigarettes. He has been smoking about 1.00 pack per day. He has never used smokeless tobacco.  ETOH:   reports current alcohol use. Family History:   Family History   Problem Relation Age of Onset    Diabetes Mother     High Blood Pressure Mother     Heart Attack Father     Mental Illness Father     Diabetes Father     Bipolar Disorder Sister     Heart Attack Maternal Grandmother     Heart Attack Maternal Grandfather     Cancer Maternal Grandfather         pancreatic    Stroke Paternal Grandmother     Stroke Paternal Grandfather     Kidney Disease Paternal Grandfather      A:  Re-administered PHQ-9 and CHIDI-7 (see below).  Patient endorses minimal symptoms of depression and subclinical symptoms of anxiety. Denies SI. No change in symptoms of anxiety since previous administration of CHIDI-7 and no change in symptoms of depression since previous administration of PHQ-9. Insight and motivation are good. PHQ Scores 11/30/2020 9/21/2020   PHQ2 Score 1 2   PHQ9 Score 1 2     Interpretation of Total Score Depression Severity: 1-4 = Minimal depression, 5-9 = Mild depression, 10-14 = Moderate depression, 15-19 = Moderately severe depression, 20-27 = Severe depression    CHIDI 7 SCORE 11/30/2020 9/21/2020   CHIDI-7 Total Score 2 4     Interpretation of CHIDI-7 score: 5-9 = mild anxiety, 10-14 = moderate anxiety, 15+ = severe anxiety. Recommend referral to behavioral health for scores 10 or greater. Diagnosis:    1. Depression, unspecified depression type    2. Anxiety disorder, unspecified type          Diagnosis Date    ADHD      Plan:  Pt interventions:  Conducted functional assessment, [place]-setting to identify pt's primary goals for [facility] visit / overall health, Supportive techniques, Emphasized self-care as important for managing overall health and Collaboratively set goals with pt re: relapse prevention.     Pt Behavioral Change Plan:   See Pt Instructions

## 2020-11-30 NOTE — PROGRESS NOTES
Lymphadenopathy:      Cervical: No cervical adenopathy. Skin:     General: Skin is warm and dry. Neurological:      General: No focal deficit present. Mental Status: He is alert and oriented to person, place, and time. Deep Tendon Reflexes: Reflexes are normal and symmetric. Psychiatric:         Mood and Affect: Mood normal.         Behavior: Behavior normal.         Thought Content: Thought content normal.         Judgment: Judgment normal.       Vitals:    11/30/20 1321   BP: 118/72   Pulse:    Temp:    SpO2:        Assessment:  Encounter Diagnoses   Name Primary?  Attention deficit hyperactivity disorder (ADHD), unspecified ADHD type Yes    Need for influenza vaccination     Anxiety and depression        Plan:  1. Attention deficit hyperactivity disorder (ADHD), unspecified ADHD type  Increased dose  - lisdexamfetamine 60 MG CAPS; Take 60 mg by mouth every morning for 30 days. Dispense: 30 capsule; Refill: 0    2. Need for influenza vaccination    - INFLUENZA, QUADV, 3 YRS AND OLDER, IM PF, PREFILL SYR OR SDV, 0.5ML (AFLURIA QUADV, PF)    3. Anxiety and depression  Follow up with Dr. Melvina Bucio PRN        Return in about 1 month (around 12/30/2020) for f/u ADHD VV.

## 2020-12-12 ENCOUNTER — HOSPITAL ENCOUNTER (EMERGENCY)
Age: 35
Discharge: ANOTHER HOSPITAL | End: 2020-12-12
Attending: STUDENT IN AN ORGANIZED HEALTH CARE EDUCATION/TRAINING PROGRAM
Payer: COMMERCIAL

## 2020-12-12 ENCOUNTER — APPOINTMENT (OUTPATIENT)
Dept: CT IMAGING | Age: 35
End: 2020-12-12
Payer: COMMERCIAL

## 2020-12-12 VITALS
SYSTOLIC BLOOD PRESSURE: 158 MMHG | RESPIRATION RATE: 16 BRPM | TEMPERATURE: 99.3 F | HEART RATE: 84 BPM | WEIGHT: 290 LBS | BODY MASS INDEX: 45.52 KG/M2 | HEIGHT: 67 IN | DIASTOLIC BLOOD PRESSURE: 87 MMHG | OXYGEN SATURATION: 97 %

## 2020-12-12 PROBLEM — K12.2 LUDWIG'S ANGINA: Status: ACTIVE | Noted: 2020-12-12

## 2020-12-12 LAB
A/G RATIO: 1.1 (ref 1.1–2.2)
ALBUMIN SERPL-MCNC: 4.2 G/DL (ref 3.4–5)
ALP BLD-CCNC: 138 U/L (ref 40–129)
ALT SERPL-CCNC: 22 U/L (ref 10–40)
ANION GAP SERPL CALCULATED.3IONS-SCNC: 8 MMOL/L (ref 3–16)
AST SERPL-CCNC: 18 U/L (ref 15–37)
BASOPHILS ABSOLUTE: 0.1 K/UL (ref 0–0.2)
BASOPHILS RELATIVE PERCENT: 0.7 %
BILIRUB SERPL-MCNC: 0.4 MG/DL (ref 0–1)
BUN BLDV-MCNC: 9 MG/DL (ref 7–20)
CALCIUM SERPL-MCNC: 9.5 MG/DL (ref 8.3–10.6)
CHLORIDE BLD-SCNC: 101 MMOL/L (ref 99–110)
CO2: 26 MMOL/L (ref 21–32)
CREAT SERPL-MCNC: 0.6 MG/DL (ref 0.9–1.3)
EOSINOPHILS ABSOLUTE: 0.1 K/UL (ref 0–0.6)
EOSINOPHILS RELATIVE PERCENT: 0.9 %
GFR AFRICAN AMERICAN: >60
GFR NON-AFRICAN AMERICAN: >60
GLOBULIN: 3.8 G/DL
GLUCOSE BLD-MCNC: 119 MG/DL (ref 70–99)
HCT VFR BLD CALC: 44 % (ref 40.5–52.5)
HEMOGLOBIN: 14.8 G/DL (ref 13.5–17.5)
LACTIC ACID, SEPSIS: 0.7 MMOL/L (ref 0.4–1.9)
LACTIC ACID, SEPSIS: 0.9 MMOL/L (ref 0.4–1.9)
LYMPHOCYTES ABSOLUTE: 1.2 K/UL (ref 1–5.1)
LYMPHOCYTES RELATIVE PERCENT: 13.6 %
MCH RBC QN AUTO: 28.1 PG (ref 26–34)
MCHC RBC AUTO-ENTMCNC: 33.7 G/DL (ref 31–36)
MCV RBC AUTO: 83.6 FL (ref 80–100)
MONOCYTES ABSOLUTE: 0.7 K/UL (ref 0–1.3)
MONOCYTES RELATIVE PERCENT: 7.7 %
NEUTROPHILS ABSOLUTE: 6.7 K/UL (ref 1.7–7.7)
NEUTROPHILS RELATIVE PERCENT: 77.1 %
PDW BLD-RTO: 13 % (ref 12.4–15.4)
PLATELET # BLD: 246 K/UL (ref 135–450)
PMV BLD AUTO: 9.4 FL (ref 5–10.5)
POTASSIUM REFLEX MAGNESIUM: 4.6 MMOL/L (ref 3.5–5.1)
RBC # BLD: 5.27 M/UL (ref 4.2–5.9)
SODIUM BLD-SCNC: 135 MMOL/L (ref 136–145)
TOTAL PROTEIN: 8 G/DL (ref 6.4–8.2)
WBC # BLD: 8.7 K/UL (ref 4–11)

## 2020-12-12 PROCEDURE — 83605 ASSAY OF LACTIC ACID: CPT

## 2020-12-12 PROCEDURE — 96365 THER/PROPH/DIAG IV INF INIT: CPT

## 2020-12-12 PROCEDURE — 99291 CRITICAL CARE FIRST HOUR: CPT

## 2020-12-12 PROCEDURE — 96375 TX/PRO/DX INJ NEW DRUG ADDON: CPT

## 2020-12-12 PROCEDURE — 80053 COMPREHEN METABOLIC PANEL: CPT

## 2020-12-12 PROCEDURE — 96361 HYDRATE IV INFUSION ADD-ON: CPT

## 2020-12-12 PROCEDURE — 70491 CT SOFT TISSUE NECK W/DYE: CPT

## 2020-12-12 PROCEDURE — 2580000003 HC RX 258: Performed by: STUDENT IN AN ORGANIZED HEALTH CARE EDUCATION/TRAINING PROGRAM

## 2020-12-12 PROCEDURE — 6360000002 HC RX W HCPCS: Performed by: PHYSICIAN ASSISTANT

## 2020-12-12 PROCEDURE — 2580000003 HC RX 258: Performed by: PHYSICIAN ASSISTANT

## 2020-12-12 PROCEDURE — 99284 EMERGENCY DEPT VISIT MOD MDM: CPT

## 2020-12-12 PROCEDURE — 85025 COMPLETE CBC W/AUTO DIFF WBC: CPT

## 2020-12-12 PROCEDURE — 6360000004 HC RX CONTRAST MEDICATION: Performed by: PHYSICIAN ASSISTANT

## 2020-12-12 RX ORDER — KETOROLAC TROMETHAMINE 30 MG/ML
15 INJECTION, SOLUTION INTRAMUSCULAR; INTRAVENOUS ONCE
Status: COMPLETED | OUTPATIENT
Start: 2020-12-12 | End: 2020-12-12

## 2020-12-12 RX ORDER — 0.9 % SODIUM CHLORIDE 0.9 %
1000 INTRAVENOUS SOLUTION INTRAVENOUS ONCE
Status: COMPLETED | OUTPATIENT
Start: 2020-12-12 | End: 2020-12-12

## 2020-12-12 RX ORDER — DEXAMETHASONE SODIUM PHOSPHATE 10 MG/ML
10 INJECTION, SOLUTION INTRAMUSCULAR; INTRAVENOUS ONCE
Status: COMPLETED | OUTPATIENT
Start: 2020-12-12 | End: 2020-12-12

## 2020-12-12 RX ADMIN — DEXAMETHASONE SODIUM PHOSPHATE 10 MG: 10 INJECTION, SOLUTION INTRAMUSCULAR; INTRAVENOUS at 13:42

## 2020-12-12 RX ADMIN — IOPAMIDOL 75 ML: 755 INJECTION, SOLUTION INTRAVENOUS at 14:11

## 2020-12-12 RX ADMIN — SODIUM CHLORIDE 1000 ML: 9 INJECTION, SOLUTION INTRAVENOUS at 13:41

## 2020-12-12 RX ADMIN — KETOROLAC TROMETHAMINE 15 MG: 30 INJECTION, SOLUTION INTRAMUSCULAR at 13:42

## 2020-12-12 RX ADMIN — SODIUM CHLORIDE 1000 ML: 9 INJECTION, SOLUTION INTRAVENOUS at 19:03

## 2020-12-12 RX ADMIN — SODIUM CHLORIDE 3 G: 900 INJECTION INTRAVENOUS at 17:06

## 2020-12-12 ASSESSMENT — ENCOUNTER SYMPTOMS
RESPIRATORY NEGATIVE: 1
FACIAL SWELLING: 1
GASTROINTESTINAL NEGATIVE: 1

## 2020-12-12 ASSESSMENT — PAIN DESCRIPTION - LOCATION: LOCATION: THROAT;MOUTH

## 2020-12-12 ASSESSMENT — PAIN SCALES - GENERAL: PAINLEVEL_OUTOF10: 9

## 2020-12-12 NOTE — ED NOTES
1454 ~ call placed to [facility], for Ilana. Call returned at [phone].      Heber Seal  12/12/20 8699

## 2020-12-12 NOTE — ED PROVIDER NOTES
I independently examined and evaluated Flor Fagan. In brief, 25-year-old male with history of ADHD who presents for lower tooth pain. Onset 3 days ago. Today having worsening symptoms. He states that he is having mild difficulty swallowing due to pain but is tolerating secretions. He does feel like he has some swelling in his neck. He denies fever, nausea, vomiting, abdominal pain, pain lower in his neck. He did take Tylenol prior to arrival.  He denies any difficulty breathing. Focused exam revealed:  Vitals:    12/12/20 1312   BP: (!) 149/96   Pulse: 102   Resp: 20   Temp: 99.2 °F (37.3 °C)   SpO2: 98%     Cardiac regular rate and rhythm, lungs clear to auscultation bilaterally. Abdomen soft and nontender. Patient is moving all extremities without difficulty, strength and sensation intact. Alert and oriented. Dental caries in the lower teeth, no evidence of periapical abscess. Floor of mouth is soft. Bilateral tonsillar hypertrophy without exudates. No stridor or meningismus. Range of motion of the neck without difficulty. He does have tenderness to palpation of the bilateral cervical lymph nodes. ED course:   Overall well appearing patient in no acute distress, presenting for dental pain for 4 days. Differential diagnosis includes but not limited to: Tooth abscess, dental caries,  low suspicion for peritonsillar abscess, viral pharyngitis, strep pharyngitis, Kale's angina, retropharyngeal abscess    I was asked to join this patient after initial work-up including CT scan. Work-up showed:  ED Course as of Dec 15 1441   Sat Dec 12, 2020   1511 Mild hyponatremia, no evidence of other significant electrolyte abnormality or kidney dysfunction.    [ER]   1511 Liver function testing unremarkable. [ER]   1511 Lactate within normal limits. [ER]   1511 No leukocytosis, anemia, thrombocytopenia.     [ER]   1512 CT soft tissue neck: IMPRESSION: Numerous dental caries with an odontogenic abscess abutting the lingual  cortex of the right mandibular body.     Associated floor of mouth myositis/cellulitis and reactive sialoadenitis of  the right submandibular gland, worrisome for early Kale's angina.     No evidence of significant airway compromise at this time but advise close  observation.     Mild cervical lymphadenopathy.    [ER]   1512 Floor of the mouth is soft, no evidence of Kale's angina on exam.  However, given CT read we will discuss with ENT for further recommendations. Patient started on Unasyn.    [ER]   1515 Did discuss with patient the findings on his CT scan. We did discuss that if he has Kale's angina, he may require intubation. We discussed possible early intubation prior to patient having any respiratory distress. He stated he did not want to be intubated unless it was an emergency. Will discuss with ENT and if ENT feels the patient requires intubation, will discuss further with patient. [ER]   1521 Patient received dexamethasone and Toradol and IV fluids as well as antibiotics. [ER]   32 61 16 ENT had low suspicion for Kale's angina based off of CT scan. Did agree with IV antibiotics. They did recommend transfer to [facility] for further evaluation. They did not recommend intubation at this time. [ER]   1918 On reassessment, patient remains tachycardic. Will repeat lactate. Receiving further fluids. Patient is not having any respiratory distress. [ER]   1957 RePeat lactate within normal limits.     [ER]   2058 Patient accepted at [facility].    [ER]      ED Course User Index  [ER] Jed Hartman MD On visual examination of the back of the throat, I do not appreciate evidence of peritonsillar abscess. Patient denies muffled voice, patient does not have neck stiffness and is tolerating secretions. There is no stridor on exam.  Low suspicion for retropharyngeal abscess or epiglottitis at this time. Floor of mouth is soft to palpation, no evidence of Kale's angina. However, given CT reading suggestive of early Kale's will admit patient for further IV Antibiotics and ENT evaluation. At this time, there is no evidence of airway compromise. KIMMY Arredondo did discuss with ENT, overall they had lower suspicion for Kale's on their evaluation of the CT scan. Furthermore, they agreed that given there is no airway compromise at this time, they did not feel that intubation was indicated at this time. Furthermore, based off my discussion with the patient, he stated he only wanted to be intubated if it was an emergency, he did not wish to be preemptively intubated for airway protection. KIMMY also spoke to oral surgery at [facility] to confirm there was no indication for emergent dental evaluation. They stated that once patient was stabilized, they could be discharged to follow-up with oral surgery for tooth extraction. We will continue to monitor closely, but no indication for intubation at this time. There is no evidence of airway compromise while patient was in the emergency department. Patient transferred to [facility] for further management. Patient transferred in stable condition. All diagnostic, treatment, and disposition decisions were made by myself in conjunction with the advanced practice provider. For all further details of the patient's emergency department visit, please see the advanced practice provider's documentation.

## 2020-12-12 NOTE — ED NOTES
Placed call to [facility] to talk with  Oral surgery      Loco Nash  12/12/20 3784

## 2020-12-12 NOTE — ED PROVIDER NOTES
[address] ED  EMERGENCY DEPARTMENT ENCOUNTER        Pt Name: Elo Bowie  MRN: [id number]  [place] 1985  Date of evaluation: 12/12/2020  Provider: Magdalene Suárez PA-C  PCP: KRAIG Loco CNP     I have seen and evaluated this patient with my supervising physician Soo Thomson MD.    [facility]       Chief Complaint   Patient presents with    Oral Swelling     right bottom toot infection started yesterday took tylenol 1.5hrs ago       HISTORY OF PRESENT ILLNESS   (Location, Timing/Onset, Context/Setting, Quality, Duration, Modifying Factors, Severity, Associated Signs and Symptoms)  Note limiting factors. Elo Bowie is a 30 y.o. male with no significant past medical history brought in today by private vehicle for complaints of right-sided lower molar dental pain and swelling. Onset of symptoms started about 2 days ago. He states he woke up today with worsening symptoms. Duration symptoms have been persistent and progressive since onset. Context includes right sided dental pain with associated swelling. He states he is having difficulty swallowing because of the amount of pain he is in. Unsure if he has had a temperature at home. Denies nausea or vomiting. Rates his pain a 9 out of 10. No radiation of pain. He did take Tylenol about an hour and a half ago. He has not taken anything else at this time for symptomatic relief. He otherwise denies any other complaints. Nothing seems to make symptoms better or worse. No aggravating complaints. No alleviating complaints. Nursing Notes were all reviewed and agreed with or any disagreements were addressed in the HPI. REVIEW OF SYSTEMS    (2-9 systems for level 4, 10 or more for level 5)     Review of Systems   Constitutional: Negative. HENT: Positive for dental problem and facial swelling. Respiratory: Negative. Cardiovascular: Negative. Vitals signs and nursing note reviewed. Constitutional:       General: He is awake. He is not in acute distress. Appearance: Normal appearance. He is well-developed and normal weight. He is not ill-appearing, toxic-appearing or diaphoretic. HENT:      Head: Normocephalic and atraumatic. Nose: Nose normal.      Mouth/Throat:      Lips: Pink. Mouth: No injury, lacerations, oral lesions or angioedema. Dentition: Abnormal dentition. Dental caries present. Tongue: No lesions. Tongue does not deviate from midline. Palate: No mass and lesions. Pharynx: Uvula midline. Pharyngeal swelling present. No oropharyngeal exudate, posterior oropharyngeal erythema or uvula swelling. Tonsils: No tonsillar exudate or tonsillar abscesses. 0 on the right. 0 on the left. Comments: No swelling to the tongue or the floor of the mouth. Neck is not rigid. Patient tolerating secretions. No muffled voice. No drooling. Uvula midline without deviation. Eyes:      General:         Right eye: No discharge. Left eye: No discharge. Neck:      Musculoskeletal: Full passive range of motion without pain, normal range of motion and neck supple. Trachea: No abnormal tracheal secretions or tracheal deviation. Meningeal: Brudzinski's sign and Kernig's sign absent. Cardiovascular:      Rate and Rhythm: Normal rate and regular rhythm. Pulses:           Radial pulses are 2+ on the right side and 2+ on the left side. Heart sounds: Normal heart sounds. No murmur. No gallop. Pulmonary:      Effort: Pulmonary effort is normal. No respiratory distress. Breath sounds: Normal breath sounds. No decreased breath sounds, wheezing, rhonchi or rales. Chest:      Chest wall: No tenderness. Musculoskeletal: Normal range of motion. General: No deformity. Right lower leg: No edema. Left lower leg: No edema.    Lymphadenopathy: Cervical: No cervical adenopathy. Right cervical: No superficial, deep or posterior cervical adenopathy. Left cervical: No superficial, deep or posterior cervical adenopathy. Skin:     General: Skin is warm and dry. Neurological:      General: No focal deficit present. Mental Status: He is alert and oriented to person, place, and time. GCS: GCS eye subscore is 4. GCS verbal subscore is 5. GCS motor subscore is 6. Psychiatric:         Behavior: Behavior normal. Behavior is cooperative. DIAGNOSTIC RESULTS   LABS:    Labs Reviewed   COMPREHENSIVE METABOLIC PANEL W/ REFLEX TO MG FOR LOW K - Abnormal; Notable for the following components:       Result Value    Sodium 135 (*)     Glucose 119 (*)     CREATININE 0.6 (*)     Alkaline Phosphatase 138 (*)     All other components within normal limits    Narrative:     Performed at:  [address] [address],  [place], [facility]   Phone [phone]   CBC WITH AUTO DIFFERENTIAL    Narrative:     Performed at:  [address] [address],  [place], [facility]   Phone [phone]   LACTATE, SEPSIS    Narrative:     Performed at:  [address] [address],  [place], [facility]   Phone [phone]   LACTATE, SEPSIS    Narrative:     Performed at:  [facility]) [facility],  [place], [facility]   Phone [phone]       All other labs were within normal range or not returned as of this dictation. EKG: All EKG's are interpreted by the Emergency Department Physician in the absence of a cardiologist.  Please see their note for interpretation of EKG.       RADIOLOGY:   Non-plain film images such as CT, Ultrasound and MRI are read by the radiologist. Plain radiographic images are visualized and preliminarily interpreted by the ED Provider with the below findings: Interpretation per the Radiologist below, if available at the time of this note:    CT SOFT TISSUE NECK W CONTRAST   Final Result   Addendum 1 of 1   ADDENDUM:   Findings were discussed with CARLA Holbrook at 2:53 pm on 12/12/2020. Final        No results found. PROCEDURES   Unless otherwise noted below, none     Procedures    CRITICAL CARE TIME   Total Critical Care time was 35 minutes, excluding separately reportable procedures. There was a high probability of clinically significant/life threatening deterioration in the patient's condition which required my urgent intervention. CONSULTS:  IP CONSULT TO OTOLARYNGOLOGY  IP CONSULT TO HOSPITALIST      EMERGENCY DEPARTMENT COURSE and DIFFERENTIAL DIAGNOSIS/MDM:   Vitals:    Vitals:    12/12/20 1649 12/12/20 1734 12/12/20 1849 12/12/20 2147   BP: 131/73 (!) 149/91 (!) 146/105 (!) 158/87   Pulse:  80 87 84   Resp:  16 18 16   Temp:       TempSrc:       SpO2:  97% 99% 97%   Weight:       Height:           Patient was given the following medications:  Medications   dexamethasone (PF) (DECADRON) injection 10 mg (10 mg Intravenous Given 12/12/20 1342)   ketorolac (TORADOL) injection 15 mg (15 mg Intravenous Given 12/12/20 1342)   0.9 % sodium chloride bolus (0 mLs Intravenous Stopped 12/12/20 1706)   iopamidol (ISOVUE-370) 76 % injection 75 mL (75 mLs Intravenous Given 12/12/20 1411)   ampicillin-sulbactam (UNASYN) 3 g in sodium chloride 0.9 % 100 mL IVPB (0 g Intravenous Stopped 12/12/20 1804)   0.9 % sodium chloride bolus (0 mLs Intravenous Stopped 12/12/20 2142)     ED Course as of Dec 13 1036   Sat Dec 12, 2020   1511 Mild hyponatremia, no evidence of other significant electrolyte abnormality or kidney dysfunction.    [ER]   1511 Liver function testing unremarkable. [ER]   1511 Lactate within normal limits. [ER]   1511 No leukocytosis, anemia, thrombocytopenia.     [ER]   1512 CT soft tissue neck: IMPRESSION: Numerous dental caries with an odontogenic abscess abutting the lingual  cortex of the right mandibular body.     Associated floor of mouth myositis/cellulitis and reactive sialoadenitis of  the right submandibular gland, worrisome for early Kale's angina.     No evidence of significant airway compromise at this time but advise close  observation.     Mild cervical lymphadenopathy.    [ER]   1512 Floor of the mouth is soft, no evidence of Kale's angina on exam.  However, given CT read we will discuss with ENT for further recommendations. Patient started on Unasyn.    [ER]   1515 Did discuss with patient the findings on his CT scan. We did discuss that if he has Kale's angina, he may require intubation. We discussed possible early intubation prior to patient having any respiratory distress. He stated he did not want to be intubated unless it was an emergency. Will discuss with ENT and if ENT feels the patient requires intubation, will discuss further with patient. [ER]   1521 Patient received dexamethasone and Toradol and IV fluids as well as antibiotics. [ER]   [id number] ENT had low suspicion for Kale's angina based off of CT scan. Did agree with IV antibiotics. They did recommend transfer to [facility] for further evaluation. They did not recommend intubation at this time. [ER]   1918 On reassessment, patient remains tachycardic. Will repeat lactate. Receiving further fluids. Patient is not having any respiratory distress. [ER]   1957 RePeat lactate within normal limits.     [ER]   2058 Patient accepted at [facility].    [ER]      ED Course User Index  [ER] Gerardo Betancur MD Patient brought in today by private vehicle for complaints of right-sided dental pain and swelling which is now causing pain with swallowing. On exam he is slightly tachycardic at 102 afebrile breathing on room air satting at 98%. Appears uncomfortable and in pain. Nontoxic no acute respiratory distress. Old labs and records reviewed at this time. Patient received fluids as well as Toradol and Decadron here. CBC reveals no acute leukocytosis. Hemoglobin of 14.8. No acute electrolyte abnormalities. Kidney function unremarkable. Lactic acid of 0.7. I received a call from Dr. Jossie Pate with [facility] radiology in regards to patient's CT soft tissue neck. Concern for early janessa's angina. Associated floor mouth cellulitis and an abscess noted to the lingual of the cortex of the right mandibular body measuring 7 x 6 mm in diameter. Patient already received Decadron and Toradol and fluids. Patient given Unasyn. I consulted ENT and spoke to Dr. Dulce Draper who recommended transfer to [facility] for ENT. Plan at this time will be to consult the hospitalist at [facility]. I spoke to Dr. Agnieszka Zimmer with [facility] hospitalist who did accept this admission. I also spoke to oral surgeon at [facility] Dr. Angel Hahn who also was agreeable with this plan. He did not feel as though patient needed immediate extraction of the teeth and patient could follow as an outpatient. Patient stable at time of transfer. Patient was seen and evaluated by my attending as well. FINAL IMPRESSION      1. Janessa's angina    2. Mandibular abscess          DISPOSITION/PLAN   DISPOSITION Decision To Transfer 12/12/2020 04:32:37 PM      PATIENT REFERREDTO:  No follow-up provider specified.     DISCHARGE MEDICATIONS:  Discharge Medication List as of 12/12/2020 10:51 PM          DISCONTINUED MEDICATIONS:  Discharge Medication List as of 12/12/2020 10:51 PM

## 2020-12-12 NOTE — ED NOTES
Placed call to [address] for update. Chris Hernandez RN states we are waiting for a bed to become available at [facility].      Olivia Acosta  12/12/20 4012

## 2020-12-13 ENCOUNTER — HOSPITAL ENCOUNTER (INPATIENT)
Age: 35
LOS: 1 days | Discharge: HOME | DRG: 603 | End: 2020-12-13
Attending: PEDIATRICS | Admitting: PEDIATRICS
Payer: COMMERCIAL

## 2020-12-13 VITALS
SYSTOLIC BLOOD PRESSURE: 123 MMHG | HEART RATE: 118 BPM | DIASTOLIC BLOOD PRESSURE: 79 MMHG | OXYGEN SATURATION: 96 % | RESPIRATION RATE: 16 BRPM | TEMPERATURE: 98.1 F

## 2020-12-13 PROBLEM — K04.7 DENTAL ABSCESS: Status: ACTIVE | Noted: 2020-12-13

## 2020-12-13 PROBLEM — Z72.0 TOBACCO USE: Status: ACTIVE | Noted: 2020-12-13

## 2020-12-13 LAB
ANION GAP SERPL CALCULATED.3IONS-SCNC: 6 MMOL/L (ref 3–16)
BASOPHILS ABSOLUTE: 0 K/UL (ref 0–0.2)
BASOPHILS RELATIVE PERCENT: 0.1 %
BUN BLDV-MCNC: 10 MG/DL (ref 7–20)
CALCIUM SERPL-MCNC: 9.4 MG/DL (ref 8.3–10.6)
CHLORIDE BLD-SCNC: 100 MMOL/L (ref 99–110)
CO2: 27 MMOL/L (ref 21–32)
CREAT SERPL-MCNC: <0.5 MG/DL (ref 0.9–1.3)
EOSINOPHILS ABSOLUTE: 0 K/UL (ref 0–0.6)
EOSINOPHILS RELATIVE PERCENT: 0 %
GFR AFRICAN AMERICAN: >60
GFR NON-AFRICAN AMERICAN: >60
GLUCOSE BLD-MCNC: 142 MG/DL (ref 70–99)
HCT VFR BLD CALC: 41.3 % (ref 40.5–52.5)
HEMOGLOBIN: 13.9 G/DL (ref 13.5–17.5)
LYMPHOCYTES ABSOLUTE: 0.7 K/UL (ref 1–5.1)
LYMPHOCYTES RELATIVE PERCENT: 6.4 %
MCH RBC QN AUTO: 28.2 PG (ref 26–34)
MCHC RBC AUTO-ENTMCNC: 33.6 G/DL (ref 31–36)
MCV RBC AUTO: 84 FL (ref 80–100)
MONOCYTES ABSOLUTE: 0.6 K/UL (ref 0–1.3)
MONOCYTES RELATIVE PERCENT: 5.5 %
NEUTROPHILS ABSOLUTE: 10.2 K/UL (ref 1.7–7.7)
NEUTROPHILS RELATIVE PERCENT: 88 %
PDW BLD-RTO: 13.4 % (ref 12.4–15.4)
PLATELET # BLD: 224 K/UL (ref 135–450)
PMV BLD AUTO: 9.2 FL (ref 5–10.5)
POTASSIUM REFLEX MAGNESIUM: 4.3 MMOL/L (ref 3.5–5.1)
RBC # BLD: 4.92 M/UL (ref 4.2–5.9)
SODIUM BLD-SCNC: 133 MMOL/L (ref 136–145)
WBC # BLD: 11.5 K/UL (ref 4–11)

## 2020-12-13 PROCEDURE — 6360000002 HC RX W HCPCS: Performed by: NURSE PRACTITIONER

## 2020-12-13 PROCEDURE — 80048 BASIC METABOLIC PNL TOTAL CA: CPT

## 2020-12-13 PROCEDURE — 6360000002 HC RX W HCPCS: Performed by: OTOLARYNGOLOGY

## 2020-12-13 PROCEDURE — 36415 COLL VENOUS BLD VENIPUNCTURE: CPT

## 2020-12-13 PROCEDURE — 85025 COMPLETE CBC W/AUTO DIFF WBC: CPT

## 2020-12-13 PROCEDURE — 1200000000 HC SEMI PRIVATE

## 2020-12-13 PROCEDURE — 99253 IP/OBS CNSLTJ NEW/EST LOW 45: CPT | Performed by: STUDENT IN AN ORGANIZED HEALTH CARE EDUCATION/TRAINING PROGRAM

## 2020-12-13 PROCEDURE — 6370000000 HC RX 637 (ALT 250 FOR IP): Performed by: NURSE PRACTITIONER

## 2020-12-13 PROCEDURE — 2580000003 HC RX 258: Performed by: NURSE PRACTITIONER

## 2020-12-13 RX ORDER — DEXAMETHASONE SODIUM PHOSPHATE 10 MG/ML
10 INJECTION INTRAMUSCULAR; INTRAVENOUS EVERY 8 HOURS
Status: DISCONTINUED | OUTPATIENT
Start: 2020-12-13 | End: 2020-12-13 | Stop reason: SDUPTHER

## 2020-12-13 RX ORDER — PROMETHAZINE HYDROCHLORIDE 25 MG/1
12.5 TABLET ORAL EVERY 6 HOURS PRN
Status: DISCONTINUED | OUTPATIENT
Start: 2020-12-13 | End: 2020-12-13 | Stop reason: HOSPADM

## 2020-12-13 RX ORDER — NICOTINE 21 MG/24HR
1 PATCH, TRANSDERMAL 24 HOURS TRANSDERMAL DAILY
Status: DISCONTINUED | OUTPATIENT
Start: 2020-12-13 | End: 2020-12-13 | Stop reason: HOSPADM

## 2020-12-13 RX ORDER — ACETAMINOPHEN 650 MG/1
650 SUPPOSITORY RECTAL EVERY 6 HOURS PRN
Status: DISCONTINUED | OUTPATIENT
Start: 2020-12-13 | End: 2020-12-13 | Stop reason: HOSPADM

## 2020-12-13 RX ORDER — KETOROLAC TROMETHAMINE 30 MG/ML
30 INJECTION, SOLUTION INTRAMUSCULAR; INTRAVENOUS EVERY 6 HOURS PRN
Status: DISCONTINUED | OUTPATIENT
Start: 2020-12-13 | End: 2020-12-13 | Stop reason: HOSPADM

## 2020-12-13 RX ORDER — SODIUM CHLORIDE 0.9 % (FLUSH) 0.9 %
10 SYRINGE (ML) INJECTION EVERY 12 HOURS SCHEDULED
Status: DISCONTINUED | OUTPATIENT
Start: 2020-12-13 | End: 2020-12-13 | Stop reason: HOSPADM

## 2020-12-13 RX ORDER — DEXAMETHASONE SODIUM PHOSPHATE 10 MG/ML
10 INJECTION INTRAMUSCULAR; INTRAVENOUS EVERY 8 HOURS
Status: DISCONTINUED | OUTPATIENT
Start: 2020-12-13 | End: 2020-12-13 | Stop reason: HOSPADM

## 2020-12-13 RX ORDER — POLYETHYLENE GLYCOL 3350 17 G/17G
17 POWDER, FOR SOLUTION ORAL DAILY PRN
Status: DISCONTINUED | OUTPATIENT
Start: 2020-12-13 | End: 2020-12-13 | Stop reason: HOSPADM

## 2020-12-13 RX ORDER — ONDANSETRON 2 MG/ML
4 INJECTION INTRAMUSCULAR; INTRAVENOUS EVERY 6 HOURS PRN
Status: DISCONTINUED | OUTPATIENT
Start: 2020-12-13 | End: 2020-12-13 | Stop reason: HOSPADM

## 2020-12-13 RX ORDER — ACETAMINOPHEN 325 MG/1
650 TABLET ORAL EVERY 6 HOURS PRN
Status: DISCONTINUED | OUTPATIENT
Start: 2020-12-13 | End: 2020-12-13 | Stop reason: HOSPADM

## 2020-12-13 RX ORDER — SODIUM CHLORIDE 0.9 % (FLUSH) 0.9 %
10 SYRINGE (ML) INJECTION PRN
Status: DISCONTINUED | OUTPATIENT
Start: 2020-12-13 | End: 2020-12-13 | Stop reason: HOSPADM

## 2020-12-13 RX ORDER — AMOXICILLIN AND CLAVULANATE POTASSIUM 875; 125 MG/1; MG/1
1 TABLET, FILM COATED ORAL 2 TIMES DAILY
Qty: 28 TABLET | Refills: 0 | Status: SHIPPED | OUTPATIENT
Start: 2020-12-13 | End: 2020-12-27

## 2020-12-13 RX ORDER — IBUPROFEN 600 MG/1
600 TABLET ORAL EVERY 8 HOURS PRN
Qty: 21 TABLET | Refills: 1 | Status: SHIPPED | OUTPATIENT
Start: 2020-12-13 | End: 2020-12-28

## 2020-12-13 RX ORDER — SODIUM CHLORIDE 9 MG/ML
INJECTION, SOLUTION INTRAVENOUS CONTINUOUS
Status: ACTIVE | OUTPATIENT
Start: 2020-12-13 | End: 2020-12-13

## 2020-12-13 RX ADMIN — ENOXAPARIN SODIUM 40 MG: 40 INJECTION SUBCUTANEOUS at 09:40

## 2020-12-13 RX ADMIN — KETOROLAC TROMETHAMINE 30 MG: 30 INJECTION, SOLUTION INTRAMUSCULAR at 12:18

## 2020-12-13 RX ADMIN — SODIUM CHLORIDE 1.5 G: 900 INJECTION INTRAVENOUS at 01:33

## 2020-12-13 RX ADMIN — KETOROLAC TROMETHAMINE 30 MG: 30 INJECTION, SOLUTION INTRAMUSCULAR at 01:34

## 2020-12-13 RX ADMIN — SODIUM CHLORIDE 1.5 G: 900 INJECTION INTRAVENOUS at 06:38

## 2020-12-13 RX ADMIN — DEXAMETHASONE SODIUM PHOSPHATE 10 MG: 10 INJECTION INTRAMUSCULAR; INTRAVENOUS at 13:34

## 2020-12-13 RX ADMIN — SODIUM CHLORIDE 1.5 G: 900 INJECTION INTRAVENOUS at 12:18

## 2020-12-13 RX ADMIN — SODIUM CHLORIDE: 900 INJECTION INTRAVENOUS at 01:33

## 2020-12-13 ASSESSMENT — PAIN DESCRIPTION - ONSET
ONSET: ON-GOING
ONSET: ON-GOING

## 2020-12-13 ASSESSMENT — PAIN SCALES - GENERAL
PAINLEVEL_OUTOF10: 7
PAINLEVEL_OUTOF10: 10
PAINLEVEL_OUTOF10: 0
PAINLEVEL_OUTOF10: 10
PAINLEVEL_OUTOF10: 10

## 2020-12-13 ASSESSMENT — PAIN DESCRIPTION - FREQUENCY: FREQUENCY: CONTINUOUS

## 2020-12-13 ASSESSMENT — PAIN DESCRIPTION - DESCRIPTORS: DESCRIPTORS: ACHING

## 2020-12-13 ASSESSMENT — PAIN DESCRIPTION - LOCATION: LOCATION: MOUTH

## 2020-12-13 ASSESSMENT — PAIN DESCRIPTION - PROGRESSION: CLINICAL_PROGRESSION: NOT CHANGED

## 2020-12-13 ASSESSMENT — PAIN DESCRIPTION - PAIN TYPE
TYPE: ACUTE PAIN
TYPE: ACUTE PAIN

## 2020-12-13 NOTE — H&P
Hospital Medicine History & Physical      PCP: Susie Ortega, APRN - CNP    Date of Admission: (Not on file)    Date of Service: Pt seen/examined on 12/13/2020 and Admitted to Inpatient with expected LOS greater than two midnights due to medical therapy. Chief Complaint: Right lower dental pain and swelling    History Of Present Illness:      30 y.o. male, with past medical history of tobacco use and morbid obesity, who was a direct admit from [facility] to [facility] with right lower dental pain and swelling. History obtained from the patient and review of EMR. The patient stated on Thursday he began to have a tooth ache on the bottom right lower molar. The patient stated he woke up Saturday morning and his pain was persistent and getting progressively worse. He stated he felt like his right lower lip and mouth were swollen. The patient stated in the afternoon he felt like he was having difficulty swallowing because of the amount of pain and swelling so he went to the emergency room. He stated he did take Tylenol without relief before he went to the emergency room. A CT soft tissue neck was obtained in the emergency room that revealed concern for early Rusty's angina. Associated floor mouth cellulitis and an abscess noted to inguinal of the cortex of the right mandibular body measuring 7 x 6 mm in diameter. Patient was given Decadron and Toradol as well as IV fluids. ENT was consulted and referred patient be transferred to [facility]. The patient was started on Unasyn. He has been admitted for further evaluation. The patient denied any other associated symptoms as well as any aggravating and/or alleviating factors. At the time of this assessment, the patient was resting comfortably in bed. He currently denies any chest pain, back pain, abdominal pain, shortness of breath, numbness, tingling, N/V/C/D, fever and/or chills.      Past Medical History:          Diagnosis Date  ADHD      Past Surgical History:          Procedure Laterality Date    APPENDECTOMY      TYMPANOSTOMY TUBE PLACEMENT       Medications Prior to Admission:      Prior to Admission medications    Medication Sig Start Date End Date Taking? Authorizing Provider   lisdexamfetamine 60 MG CAPS Take 60 mg by mouth every morning for 30 days. 11/30/20 12/30/20 Yes KRAIG Chand CNP     Allergies:  Patient has no known allergies. Social History:      The patient currently lives at home    TOBACCO:   reports that he has been smoking cigarettes. He has been smoking about 1.00 pack per day. He has never used smokeless tobacco.  ETOH:   reports current alcohol use. E-Cigarettes/Vaping Use     Questions Responses    E-Cigarette/Vaping Use Former User    Start Date     Passive Exposure     Quit Date     Counseling Given     Comments         Family History:      Reviewed in detail. Positive as follows:        Problem Relation Age of Onset    Diabetes Mother     High Blood Pressure Mother     Heart Attack Father     Mental Illness Father     Diabetes Father     Bipolar Disorder Sister     Heart Attack Maternal Grandmother     Heart Attack Maternal Grandfather     Cancer Maternal Grandfather         pancreatic    Stroke Paternal Grandmother     Stroke Paternal Grandfather     Kidney Disease Paternal Grandfather      REVIEW OF SYSTEMS:   Pertinent positives as noted in the HPI. All other systems reviewed and negative. PHYSICAL EXAM PERFORMED:    There were no vitals taken for this visit. General appearance: Pleasant, obese male in no apparent distress, appears stated age and cooperative. HEENT:  Pupils equal, round, and reactive to light. Dental carries. Pharyngeal swelling  Extra ocular muscles intact. Conjunctivae/corneas clear. Neck: Supple, with full range of motion. No jugular venous distention. Trachea midline. Respiratory:  Normal respiratory effort. Clear to auscultation, bilaterally without Rales/Wheezes/Rhonchi. Cardiovascular:  Regular rate and rhythm with normal S1/S2 without murmurs, rubs or gallops. Abdomen: Soft, obese, round non-tender, non-distended with normal bowel sounds. Musculoskeletal:  No clubbing, cyanosis or edema bilaterally. Full range of motion without deformity. Skin: Skin color, texture, turgor normal.  No significant rashes or lesions. Neurologic:  Neurovascularly intact. Cranial nerves: II-XII intact, grossly non-focal.  Psychiatric:  Alert and oriented, thought content appropriate, normal insight  Capillary Refill: Brisk,< 3 seconds   Peripheral Pulses: +2 palpable, equal bilaterally     Labs:     Recent Labs     12/12/20  1330   WBC 8.7   HGB 14.8   HCT 44.0        Recent Labs     12/12/20  1330   *   K 4.6      CO2 26   BUN 9   CREATININE 0.6*   CALCIUM 9.5     Recent Labs     12/12/20  1330   AST 18   ALT 22   BILITOT 0.4   ALKPHOS 138*     Urinalysis:    No results found for: Angela Joaquim, [address], BACTERIA, RBCUA, BLOODU, [address], [place] [address]    Radiology:     CXR: I have reviewed the CXR with the following interpretation: N/A    EKG:  I have reviewed the EKG with the following interpretation: N/A    No orders to display     ASSESSMENT:    Active Hospital Problems    Diagnosis Date Noted    Dental abscess [K04.7] 12/13/2020    Tobacco use [Z72.0] 12/13/2020    Janessa's angina [K12.2] 12/12/2020     PLAN:    Right lower dental pain and swelling likely d/t dental abscess with janessa's angina  -CT soft tissue neck revealed numerous dental caries within odontogenic abscess abutting the lingual cortex of the right mandibular body.   Associated floor of mouth myositis/cellulitis and reactive sialoadenitis of the right submandibular gland, worrisome for early Janessa's angina.  -unasyn given in ED at [facility] and continued 12/13/2020  -decadron given in ED  -toradol given in ED -ENT consulted in ED - appreciate recommendations in advance  -NPO  -continuous pulse ox  -keep head of bed elevated  > 45 degrees  -prn pain medication  -MIVF    Morbid obesity   With Body mass index is 45.4 kg/m². Complicating assessment and treatment. Placing patient at risk for multiple co-morbidities as well as early death and contributing to the patient's presentation. Counseled on weight loss    Tobacco use  -tobacco cessation  -nicotine patch    DVT Prophylaxis: Lovenox    Diet: No diet orders on file     Code Status: No Order    PT/OT Eval Status: No indication for need at this time    Dispo - 2-3 days pending clinical improvement     KRAIG Rice CNP    Thank you KRAIG Figueroa CNP for the opportunity to be involved in this patient's care.  If you have any questions or concerns please feel free to contact me at [phone].  -----------------------------Dr. Camelia Cleary------------------------------------

## 2020-12-13 NOTE — PROGRESS NOTES
4 Eyes Skin Assessment     NAME:  Tacos Barry OF BIRTH:  1985  MEDICAL RECORD NUMBER:  [id number]    The patient is being assess for  Admission    I agree that 2 RN's have performed a thorough Head to Toe Skin Assessment on the patient. ALL assessment sites listed below have been assessed. Areas assessed by both nurses:    Head, Face, Ears, Shoulders, Back, Chest, Arms, Elbows, Hands, Sacrum. Buttock, Coccyx, Ischium and Legs. Feet and Heels        Does the Patient have a Wound?  No noted wound(s)       Jeff Prevention initiated:  NA   Wound Care Orders initiated:  NA    Pressure Injury (Stage 3,4, Unstageable, DTI, NWPT, and Complex wounds) if present place consult order under [de-identified] NA    New and Established Ostomies if present place consult order under : NA      Nurse 1 eSignature: Electronically signed by Kristie Olvera RN on 12/13/20 at 12:59 AM EST    **SHARE this note so that the co-signing nurse is able to place an eSignature**    Nurse 2 eSignature: Electronically signed by Iman Marsh RN on 12/13/20 at 4:17 AM EST

## 2020-12-13 NOTE — CONSULTS
Patrice      Patient Name: Leonel Mcelroy Record Number:  [id number]  Primary Care Physician:  Chiki Hicks, APRN - CNP  Date of Consultation: 12/12/2020      Chief Complaint:   Facial swelling       HISTORY OF PRESENT ILLNESS  Sanaz Miramontes is a(n) 30 y.o. male who presents today for evaluation of facial swelling. He was seen at [facility] for facial swelling and dysphagia. He states that this started two days ago and progressively got worse so he became worried and decided to be evaluated. A CT neck was performed and I independently reviewed showing facial cellulitis and swelling of the sublingual and submandibular tissue. There was a right lower third molar periapical abscess. There was no evidence of airway compromise. The patient states that since he received IV antibiotics and steroids his symptoms have started to improve significantly. He is now tolerating a liquid diet and has no difficulties speaking or breathing. I independently reviewed the patients past medical history, past surgical history, and social history. They are unremarkable except as noted in the HPI and below. The patient denies any family history related to the current complaint, and they deny any family history of bleeding disorders or difficulties with anesthesia unless noted below.      Patient Active Problem List   Diagnosis    Kale's angina    Dental abscess    Tobacco use     Past Surgical History:   Procedure Laterality Date    APPENDECTOMY      TYMPANOSTOMY TUBE PLACEMENT       Family History   Problem Relation Age of Onset    Diabetes Mother     High Blood Pressure Mother     Heart Attack Father     Mental Illness Father     Diabetes Father     Bipolar Disorder Sister     Heart Attack Maternal Grandmother     Heart Attack Maternal Grandfather     Cancer Maternal Grandfather         pancreatic  Stroke Paternal Grandmother     Stroke Paternal Grandfather     Kidney Disease Paternal Grandfather      Social History     Tobacco Use    Smoking status: Current Every Day Smoker     Packs/day: 1.00     Types: Cigarettes    Smokeless tobacco: Never Used   Substance Use Topics    Alcohol use: Yes     Comment: OCCASIONALLY    Drug use: No        Admission on 12/12/2020, Discharged on 12/12/2020   Component Date Value Ref Range Status    WBC 12/12/2020 8.7  4.0 - 11.0 K/uL Final    RBC 12/12/2020 5.27  4.20 - 5.90 M/uL Final    Hemoglobin 12/12/2020 14.8  13.5 - 17.5 g/dL Final    Hematocrit 12/12/2020 44.0  40.5 - 52.5 % Final    MCV 12/12/2020 83.6  80.0 - 100.0 fL Final    MCH 12/12/2020 28.1  26.0 - 34.0 pg Final    MCHC 12/12/2020 33.7  31.0 - 36.0 g/dL Final    RDW 12/12/2020 13.0  12.4 - 15.4 % Final    Platelets 79/68/7446 246  135 - 450 K/uL Final    MPV 12/12/2020 9.4  5.0 - 10.5 fL Final    Neutrophils % 12/12/2020 77.1  % Final    Lymphocytes % 12/12/2020 13.6  % Final    Monocytes % 12/12/2020 7.7  % Final    Eosinophils % 12/12/2020 0.9  % Final    Basophils % 12/12/2020 0.7  % Final    Neutrophils Absolute 12/12/2020 6.7  1.7 - 7.7 K/uL Final    Lymphocytes Absolute 12/12/2020 1.2  1.0 - 5.1 K/uL Final    Monocytes Absolute 12/12/2020 0.7  0.0 - 1.3 K/uL Final    Eosinophils Absolute 12/12/2020 0.1  0.0 - 0.6 K/uL Final    Basophils Absolute 12/12/2020 0.1  0.0 - 0.2 K/uL Final    Sodium 12/12/2020 135* 136 - 145 mmol/L Final    Potassium reflex Magnesium 12/12/2020 4.6  3.5 - 5.1 mmol/L Final    Chloride 12/12/2020 101  99 - 110 mmol/L Final    CO2 12/12/2020 26  21 - 32 mmol/L Final    Anion Gap 12/12/2020 8  3 - 16 Final    Glucose 12/12/2020 119* 70 - 99 mg/dL Final    BUN 12/12/2020 9  7 - 20 mg/dL Final    CREATININE 12/12/2020 0.6* 0.9 - 1.3 mg/dL Final    GFR Non- 12/12/2020 >60  >60 Final Comment: >60 mL/min/1.73m2 EGFR, calc. for ages 25 and older using the  MDRD formula (not corrected for weight), is valid for stable  renal function.  GFR  12/12/2020 >60  >60 Final    Comment: Chronic Kidney Disease: less than 60 ml/min/1.73 sq.m. Kidney Failure: less than 15 ml/min/1.73 sq.m. Results valid for patients 18 years and older.  Calcium 12/12/2020 9.5  8.3 - 10.6 mg/dL Final    Total Protein 12/12/2020 8.0  6.4 - 8.2 g/dL Final    Alb 12/12/2020 4.2  3.4 - 5.0 g/dL Final    Albumin/Globulin Ratio 12/12/2020 1.1  1.1 - 2.2 Final    Total Bilirubin 12/12/2020 0.4  0.0 - 1.0 mg/dL Final    Alkaline Phosphatase 12/12/2020 138* 40 - 129 U/L Final    ALT 12/12/2020 22  10 - 40 U/L Final    AST 12/12/2020 18  15 - 37 U/L Final    Comment: Specimen hemolysis has exceeded the interference as defined by Roche. Value may be falsely increased. Suggest reorder and recollection if  clinically indicated.  Globulin 12/12/2020 3.8  g/dL Final    Lactic Acid, Sepsis 12/12/2020 0.7  0.4 - 1.9 mmol/L Final    Lactic Acid, Sepsis 12/12/2020 0.9  0.4 - 1.9 mmol/L Final        DRUG/FOOD ALLERGIES: Patient has no known allergies. CURRENT MEDICATIONS  Prior to Admission medications    Medication Sig Start Date End Date Taking? Authorizing Provider   lisdexamfetamine 60 MG CAPS Take 60 mg by mouth every morning for 30 days.  11/30/20 12/30/20 Yes [facility], APRN - CNP       REVIEW OF SYSTEMS  The following systems were reviewed and revealed the following in addition to any already discussed in the HPI:    CONSTITUTIONAL: no weight loss, no fever, no night sweats, no chills  EYES: no vision changes, no blurry vision  EARS: no changes in hearing, no otalgia  NOSE: no epistaxis, no rhinorrhea  RESPIRATORY: no  Difficulty breathing, no shortness of breath  CV: no chest pain, no Peripheral vascular disease  HEME: No coagulation disorder, no Bleeding disorder NEURO: no TIA or stroke-like symptoms  SKIN: No new rashes in the head and neck, no recent skin cancers  MOUTH: No new ulcers, no recent teeth infections  GASTROINTESTINAL: No diarrhea, stomach pain  PSYCH: No anxiety, no depression      PHYSICAL EXAM  BP (!) 140/83   Pulse 81   Temp 97.8 °F (36.6 °C)   Resp 16   SpO2 96%     GENERAL: No acute distress, alert and oriented, no hoarseness, strong voice  EYES: EOMI, Anti-icteric  HENT:   Head: Normocephalic and atraumatic. Face:  Symmetric, facial nerve intact, no sinus tenderness  Right Ear: Normal external ear, normal external auditory canal, intact tympanic membrane with normal mobility and aerated middle ear  Left Ear: Normal external ear, normal external auditory canal, intact tympanic membrane with normal mobility and aerated middle ear  Mouth/Oral Cavity:  normal lips, Uvula is midline, no mucosal lesions, no trismus, Poor dentition, normal salivary quality/flow  Oropharynx/Larynx:  normal oropharynx, Normal tonsils; patient did not tolerate mirror exam due to excessive gag reflex  Nose:Normal external nasal appearance. Anterior rhinoscopy shows a normal septum. Normal turbinates. Normal mucosa   NECK: Normal range of motion, no thyromegaly, trachea is midline, no lymphadenopathy, no neck masses, no crepitus  CHEST: Normal respiratory effort, no retractions, breathing comfortably  SKIN: No rashes, normal appearing skin, no evidence of skin lesions/tumors  Neuro:  cranial nerve II-XII intact; normal gait  Cardio:  no edema    No FOM or tongue swelling. Multiple carious teeth with exposed tooth roots. ASSESSMENT/PLAN  1) Facial cellulitis  2) Odynophagia  3) Odontogenic infection  4) Multiple carious teeth    -Patient has improved significantly since IV antibiotics and steroids  -Recommend 14 days of Augmentin at time of DC. Can stop Vanc, cont monotherapy with Unasyn.  3 doses of IV decadron 10mg or until time of DC -Instructed patient he needed close follow up with a dentist or oral surgeon to have his carious teeth removed. He is at risk of developing recurrent infections with the diseased teeth still present. Additionally, advised patient that he needs to return to the ER if he develops recurrent swelling, dysphagia or airway issues. If he requires repeat admission, would recommend transfer to  for evaluation by OMFS there and consideration of tooth extraction at that time. This note was generated completely or in part utilizing Dragon dictation speech recognition software. Occasionally, words are mistranscribed and despite editing, the text may contain inaccuracies due to incorrect word recognition. If further clarification is needed please contact the office at [phone].

## 2020-12-15 ENCOUNTER — TELEPHONE (OUTPATIENT)
Dept: INTERNAL MEDICINE CLINIC | Age: 35
End: 2020-12-15

## 2020-12-15 NOTE — TELEPHONE ENCOUNTER
Matthew [facility] Transitions Initial Follow Up Call    Outreach made within 2 business days of discharge: Yes    Patient: Jodie Guerrero Patient : 1985   MRN: <[id number]>  Reason for Admission: There are no discharge diagnoses documented for the most recent discharge.   Discharge Date: 20       Spoke with: unable to leave voicemail    Discharge department/facility: [facility]    Scheduled appointment with PCP within 7-14 days    Follow Up  Future Appointments   Date Time Provider Shena Lizama   2020  1:40 PM Gillermina Councilman, APRN - PHONG Aguiar, [facility]

## 2020-12-22 NOTE — DISCHARGE SUMMARY
Hospital Medicine Discharge Summary    Patient ID: Jairo Ha      Patient's PCP: Khadijah Wilcox, APRN - CNP    Admit Date: 12/13/2020     Discharge Date: 12/13/2020      Admitting Physician: El Hall MD     Discharge Physician: Trever Gaitan MD     Discharge Diagnoses: Active Hospital Problems    Diagnosis    Dental abscess [K04.7]    Tobacco use [Z72.0]    Janessa's angina [K12.2]       The patient was NOT SEEN BY ME NOR examined on day of discharge and this discharge summary is in conjunction with any daily H/P or progress note from day of discharge. Hospital Course: patient was not seen by me on day of discharge - order placed by other provider        Right lower dental pain and swelling likely d/t dental abscess with janessa's angina  -CT soft tissue neck revealed numerous dental caries within odontogenic abscess abutting the lingual cortex of the right mandibular body. Associated floor of mouth myositis/cellulitis and reactive sialoadenitis of the right submandibular gland, worrisome for early Janessa's angina.  -unasyn given in ED at [facility] and continued 12/13/2020  -decadron given in ED  -toradol given in ED  -ENT consulted in ED - appreciate recommendations in advance  -NPO  -continuous pulse ox  -keep head of bed elevated  > 45 degrees  -prn pain medication  -MIVF     Morbid obesity   With Body mass index is 45.9 kg/m². Complicating assessment and treatment. Placing patient at risk for multiple co-morbidities as well as early death and contributing to the patient's presentation. Counseled on weight loss     Tobacco use  -tobacco cessation  -nicotine patch       Labs:  For convenience and continuity at follow-up the following most recent labs are provided:      CBC:    Lab Results   Component Value Date    WBC 11.5 12/13/2020    HGB 13.9 12/13/2020    HCT 41.3 12/13/2020     12/13/2020       Renal:    Lab Results   Component Value Date     12/13/2020 K 4.3 12/13/2020     12/13/2020    CO2 27 12/13/2020    BUN 10 12/13/2020    CREATININE <0.5 12/13/2020    CALCIUM 9.4 12/13/2020         Significant Diagnostic Studies    Radiology:   No orders to display          Consults:     IP CONSULT TO OTOLARYNGOLOGY    Disposition: Home     Condition at Discharge: Stable    Discharge Instructions/Follow-up:  w/ PCP 1-2 weeks and subspecialists as arranged. Code Status:  Full Code    Activity: activity as tolerated    Diet: regular diet      Discharge Medications:     Discharge Medication List as of 12/13/2020  7:31 PM           Details   ibuprofen (ADVIL;MOTRIN) 600 MG tablet Take 1 tablet by mouth every 8 hours as needed for Pain, Disp-21 tablet, R-1Normal      amoxicillin-clavulanate (AUGMENTIN) 875-125 MG per tablet Take 1 tablet by mouth 2 times daily for 14 days, Disp-28 tablet, R-0Normal              Details   lisdexamfetamine 60 MG CAPS Take 60 mg by mouth every morning for 30 days. , Disp-30 capsule,R-0Normal             Time Spent on discharge is more than 30 minutes in the examination, evaluation, counseling and review of medications and discharge plan. Signed:    Blanco Mehta MD   12/22/2020      Thank you KRAIG Nicole CNP for the opportunity to be involved in this patient's care. If you have any questions or concerns please feel free to contact me at [phone].

## 2020-12-28 ENCOUNTER — VIRTUAL VISIT (OUTPATIENT)
Dept: FAMILY MEDICINE CLINIC | Age: 35
End: 2020-12-28
Payer: COMMERCIAL

## 2020-12-28 PROCEDURE — 99213 OFFICE O/P EST LOW 20 MIN: CPT | Performed by: NURSE PRACTITIONER

## 2020-12-28 ASSESSMENT — ENCOUNTER SYMPTOMS
SHORTNESS OF BREATH: 0
WHEEZING: 0

## 2020-12-28 NOTE — PROGRESS NOTES
2020    TELEHEALTH EVALUATION -- Audio/Visual (During OPAVX-04 public health emergency)    HPI:    Bassam Mahmood (:  1985) has requested an audio/video evaluation for the following concern(s):    ADHD: Vyvanse 60mg- no complaints- increased dose is working better. No trouble eating or sleeping- denies heart palpitations    Was in the hospital a few weeks back for an infection- thinks it was from his tooth. Feeling better now after antibiotics    Review of Systems   Constitutional: Negative for chills and fever. Respiratory: Negative for shortness of breath and wheezing. Cardiovascular: Negative for chest pain and palpitations. Psychiatric/Behavioral: Decreased concentration: med helps. Prior to Visit Medications    Medication Sig Taking? Authorizing Provider   Lisdexamfetamine Dimesylate 60 MG CAPS Take 60 mg by mouth every morning for 30 days.  Yes KRAIG Nicole - CNP       Social History     Tobacco Use    Smoking status: Current Every Day Smoker     Packs/day: 1.00     Types: Cigarettes    Smokeless tobacco: Never Used   Substance Use Topics    Alcohol use: Yes     Comment: OCCASIONALLY    Drug use: No        No Known Allergies,   Past Medical History:   Diagnosis Date    ADHD    ,   Past Surgical History:   Procedure Laterality Date    APPENDECTOMY      TYMPANOSTOMY TUBE PLACEMENT     ,   Family History   Problem Relation Age of Onset    Diabetes Mother     High Blood Pressure Mother     Heart Attack Father     Mental Illness Father     Diabetes Father     Bipolar Disorder Sister     Heart Attack Maternal Grandmother     Heart Attack Maternal Grandfather     Cancer Maternal Grandfather         pancreatic    Stroke Paternal Grandmother     Stroke Paternal Grandfather     Kidney Disease Paternal Grandfather        PHYSICAL EXAMINATION: [ INSTRUCTIONS:  \"[x]\" Indicates a positive item  \"[]\" Indicates a negative item  -- DELETE ALL ITEMS NOT EXAMINED]  Vital Signs: (As obtained by patient/caregiver or practitioner observation)    Blood pressure-  Heart rate-    Respiratory rate-    Temperature-  Pulse oximetry-     Constitutional: [x] Appears well-developed and well-nourished [x] No apparent distress      [] Abnormal-   Mental status  [x] Alert and awake  [x] Oriented to person/place/time [x]Able to follow commands      Eyes:  EOM    []  Normal  [] Abnormal-  Sclera  [x]  Normal  [] Abnormal -         Discharge []  None visible  [] Abnormal -    HENT:   [x] Normocephalic, atraumatic. [] Abnormal   [] Mouth/Throat: Mucous membranes are moist.     External Ears [] Normal  [] Abnormal-     Neck: [] No visualized mass     Pulmonary/Chest: [x] Respiratory effort normal.  [x] No visualized signs of difficulty breathing or respiratory distress        [] Abnormal-      Musculoskeletal:   [] Normal gait with no signs of ataxia         [x] Normal range of motion of neck        [] Abnormal-       Neurological:        [x] No Facial Asymmetry (Cranial nerve 7 motor function) (limited exam to video visit)          [x] No gaze palsy        [] Abnormal-         Skin:        [x] No significant exanthematous lesions or discoloration noted on facial skin         [] Abnormal-            Psychiatric:       [x] Normal Affect [x] No Hallucinations        [] Abnormal-     Other pertinent observable physical exam findings-     ASSESSMENT/PLAN:  1. Attention deficit hyperactivity disorder (ADHD), unspecified ADHD type    - Lisdexamfetamine Dimesylate 60 MG CAPS; Take 60 mg by mouth every morning for 30 days. Dispense: 30 capsule; Refill: 0      Return in about 3 months (around 3/28/2021). Silverio Sheridan is a 30 y.o. male being evaluated by a Virtual Visit (video visit) encounter to address concerns as mentioned above. A caregiver was present when appropriate. Due to this being a TeleHealth encounter (During NJM-57 public health emergency), evaluation of the following organ systems was limited: Vitals/Constitutional/EENT/Resp/CV/GI//MS/Neuro/Skin/Heme-Lymph-Imm. Pursuant to the emergency declaration under the [address] [place] [address] and the [employer] and [employer] Act, this Virtual Visit was conducted with patient's (and/or legal guardian's) consent, to reduce the patient's risk of exposure to COVID-19 and provide necessary medical care. The patient (and/or legal guardian) has also been advised to contact this office for worsening conditions or problems, and seek emergency medical treatment and/or call 911 if deemed necessary. Patient identification was verified at the start of the visit: Yes    Total time spent on this encounter: Not billed by time    Services were provided through a video synchronous discussion virtually to substitute for in-person clinic visit. Patient and provider were located at their individual homes. --KRAIG Soto CNP on 12/28/2020 at 2:03 PM    An electronic signature was used to authenticate this note.

## 2021-01-27 DIAGNOSIS — F90.9 ATTENTION DEFICIT HYPERACTIVITY DISORDER (ADHD), UNSPECIFIED ADHD TYPE: ICD-10-CM

## 2021-02-27 DIAGNOSIS — F90.9 ATTENTION DEFICIT HYPERACTIVITY DISORDER (ADHD), UNSPECIFIED ADHD TYPE: ICD-10-CM

## 2021-03-26 DIAGNOSIS — F90.9 ATTENTION DEFICIT HYPERACTIVITY DISORDER (ADHD), UNSPECIFIED ADHD TYPE: ICD-10-CM

## 2021-04-30 DIAGNOSIS — F90.9 ATTENTION DEFICIT HYPERACTIVITY DISORDER (ADHD), UNSPECIFIED ADHD TYPE: ICD-10-CM

## 2021-06-30 ENCOUNTER — TELEMEDICINE (OUTPATIENT)
Dept: FAMILY MEDICINE CLINIC | Age: 36
End: 2021-06-30
Payer: COMMERCIAL

## 2021-06-30 DIAGNOSIS — F90.9 ATTENTION DEFICIT HYPERACTIVITY DISORDER (ADHD), UNSPECIFIED ADHD TYPE: Primary | ICD-10-CM

## 2021-06-30 PROCEDURE — 99213 OFFICE O/P EST LOW 20 MIN: CPT | Performed by: NURSE PRACTITIONER

## 2021-06-30 ASSESSMENT — ENCOUNTER SYMPTOMS
RESPIRATORY NEGATIVE: 1
GASTROINTESTINAL NEGATIVE: 1

## 2021-06-30 NOTE — PROGRESS NOTES
2021    TELEHEALTH EVALUATION -- [facility]Visual ([facility])    HPI:    Linda Rojas (:  1985) has requested an audio/video evaluation for the following concern(s):    Vyvanse 60 mg daily. He states he is not sure if is working well for him. He states after the first 3-4 hours it is not working. Patient presents today virtually to follow-up on his ADHD. He has been on Vyvanse 60 mg daily. He denies any side effects states he is able to sleep well and denies any changes in appetite. He is concerned that he does not feel that the Vyvanse is lasting long enough for him. He states it works well for the first 3 to 4 hours and then he feels like by early afternoon it stops working. He states his wife has noticed as well  that by the afternoon it does not seem like it is helping anymore. He has tried methylphenidate in the past.  He states he did not tolerate that well it causes anxiety to be worse. Review of Systems   Constitutional: Negative. HENT: Negative. Respiratory: Negative. Gastrointestinal: Negative. Musculoskeletal: Negative. Skin: Negative. Neurological: Negative. Psychiatric/Behavioral: Positive for decreased concentration. Prior to Visit Medications    Medication Sig Taking? Authorizing Provider   lisdexamfetamine (VYVANSE) 70 MG capsule Take 1 capsule by mouth every morning for 30 days.  Yes KRAIG Loepz - CNP       Social History     Tobacco Use    Smoking status: Current Every Day Smoker     Packs/day: 1.00     Types: Cigarettes    Smokeless tobacco: Never Used   Vaping Use    Vaping Use: Former   Substance Use Topics    Alcohol use: Yes     Comment: OCCASIONALLY    Drug use: No          PHYSICAL EXAMINATION:  [ INSTRUCTIONS:  \"[x]\" Indicates a positive item  \"[]\" Indicates a negative item  -- DELETE ALL ITEMS NOT EXAMINED]  Vital Signs: (As obtained by patient/caregiver or practitioner observation)    Blood pressure-  Heart rate-    Respiratory rate-    Temperature-  Pulse oximetry-     Constitutional: [x] Appears well-developed and well-nourished [x] No apparent distress      [] Abnormal-   Mental status  [x] Alert and awake  [x] Oriented to person/place/time []Able to follow commands      Eyes:  EOM    []  Normal  [] Abnormal-  Sclera  []  Normal  [] Abnormal -         Discharge []  None visible  [] Abnormal -    HENT:   [x] Normocephalic, atraumatic. [] Abnormal   [x] Mouth/Throat: Mucous membranes are moist.     External Ears [] Normal  [] Abnormal-     Neck: [] No visualized mass     Pulmonary/Chest: [] Respiratory effort normal.  [] No visualized signs of difficulty breathing or respiratory distress        [] Abnormal-      Musculoskeletal:   [] Normal gait with no signs of ataxia         [x] Normal range of motion of neck        [] Abnormal-       Neurological:        [] No Facial Asymmetry (Cranial nerve 7 motor function) (limited exam to video visit)          [] No gaze palsy        [] Abnormal-         Skin:        [x] No significant exanthematous lesions or discoloration noted on facial skin         [] Abnormal-            Psychiatric:       [] Normal Affect [] No Hallucinations        [] Abnormal-     Other pertinent observable physical exam findings-     ASSESSMENT/PLAN:  1. Attention deficit hyperactivity disorder (ADHD), unspecified ADHD type  Discussed different options with patient. Given his increased anxiety with the methylphenidate we will try increasing the Vyvanse to 70 prior to switching to any other medications. He will follow up with Chau Jenkins in 1 month. - lisdexamfetamine (VYVANSE) 70 MG capsule; Take 1 capsule by mouth every morning for 30 days. Dispense: 30 capsule; Refill: 0          Tiana Mason is a 30 y.o. male being evaluated by a Virtual Visit (video visit) encounter to address concerns as mentioned above. A caregiver was present when appropriate.

## 2021-07-01 ENCOUNTER — TELEPHONE (OUTPATIENT)
Dept: ADMINISTRATIVE | Age: 36
End: 2021-07-01

## 2021-07-29 DIAGNOSIS — F90.9 ATTENTION DEFICIT HYPERACTIVITY DISORDER (ADHD), UNSPECIFIED ADHD TYPE: ICD-10-CM

## 2021-08-29 DIAGNOSIS — F90.9 ATTENTION DEFICIT HYPERACTIVITY DISORDER (ADHD), UNSPECIFIED ADHD TYPE: ICD-10-CM

## 2021-08-30 NOTE — TELEPHONE ENCOUNTER
Last Office Visit  -  6/30/21 with EG   Next Office Visit  -  None    Last Filled  -  7/29/21  Last UDS -  None  Contract -  None

## 2021-09-23 ENCOUNTER — TELEPHONE (OUTPATIENT)
Dept: FAMILY MEDICINE CLINIC | Age: 36
End: 2021-09-23

## 2021-09-23 DIAGNOSIS — F90.9 ATTENTION DEFICIT HYPERACTIVITY DISORDER (ADHD), UNSPECIFIED ADHD TYPE: Primary | ICD-10-CM

## 2021-09-23 RX ORDER — DEXTROAMPHETAMINE SACCHARATE, AMPHETAMINE ASPARTATE, DEXTROAMPHETAMINE SULFATE AND AMPHETAMINE SULFATE 5; 5; 5; 5 MG/1; MG/1; MG/1; MG/1
20 TABLET ORAL DAILY
Qty: 30 TABLET | Refills: 0 | Status: SHIPPED | OUTPATIENT
Start: 2021-09-23 | End: 2021-10-25 | Stop reason: ALTCHOICE

## 2021-09-23 NOTE — TELEPHONE ENCOUNTER
Pt wife called stating that pt lost job, needs less expensive alternative to Vyvanse due to loss of insurance. Please contact pt wife Joyce Pencil at [phone].

## 2021-10-18 ENCOUNTER — PATIENT MESSAGE (OUTPATIENT)
Dept: FAMILY MEDICINE CLINIC | Age: 36
End: 2021-10-18

## 2021-10-18 NOTE — TELEPHONE ENCOUNTER
From: Anitha Jones  To: Tomer Salamanca, APRN - CNP  Sent: 10/18/2021 6:12 PM EDT  Subject: Prescription Question    Nellie Galdamez recently switched careers and while I was waiting on my new insurance to kick in, I asked to be switched from Vyvanse to something I could afford out of pocket. I noticed I was having some issues the last couple months I was on Vyvanse, but it's become more of an issue since I switched medications. I'm having a lot of issues staying focused at work this month, staying on task without randomly doodling and following longer conversations. I was just wondering if maybe I'm not on the correct dosage and wanted to see what your recommendation would be moving forward. I appreciate your time and you and your office working with me on this. You have all been wonderful. Thank you and I hope you have a great day.      Anitha Jones

## 2021-10-19 ENCOUNTER — TELEPHONE (OUTPATIENT)
Dept: FAMILY MEDICINE CLINIC | Age: 36
End: 2021-10-19

## 2021-10-19 NOTE — TELEPHONE ENCOUNTER
Pt was recently given adderall 20 mg instead of vyvanse due to the cost ,pt states the 20 mg  Is not enough wanting to know if this can be increased pharmacy [facility] please advised

## 2021-10-19 NOTE — TELEPHONE ENCOUNTER
Spoke with pt, he states he has to call his insurance to make sure he is covered before he can do the VV will call back afterwards.

## 2021-10-25 ENCOUNTER — TELEMEDICINE (OUTPATIENT)
Dept: FAMILY MEDICINE CLINIC | Age: 36
End: 2021-10-25
Payer: COMMERCIAL

## 2021-10-25 DIAGNOSIS — F90.9 ATTENTION DEFICIT HYPERACTIVITY DISORDER (ADHD), UNSPECIFIED ADHD TYPE: Primary | ICD-10-CM

## 2021-10-25 PROCEDURE — 99213 OFFICE O/P EST LOW 20 MIN: CPT | Performed by: NURSE PRACTITIONER

## 2021-10-25 RX ORDER — DEXTROAMPHETAMINE SACCHARATE, AMPHETAMINE ASPARTATE MONOHYDRATE, DEXTROAMPHETAMINE SULFATE AND AMPHETAMINE SULFATE 5; 5; 5; 5 MG/1; MG/1; MG/1; MG/1
20 CAPSULE, EXTENDED RELEASE ORAL EVERY MORNING
Qty: 30 CAPSULE | Refills: 0 | Status: SHIPPED | OUTPATIENT
Start: 2021-10-25 | End: 2021-11-23 | Stop reason: SDUPTHER

## 2021-10-25 RX ORDER — DEXTROAMPHETAMINE SACCHARATE, AMPHETAMINE ASPARTATE, DEXTROAMPHETAMINE SULFATE AND AMPHETAMINE SULFATE 2.5; 2.5; 2.5; 2.5 MG/1; MG/1; MG/1; MG/1
10 TABLET ORAL DAILY
Qty: 30 TABLET | Refills: 0 | Status: SHIPPED | OUTPATIENT
Start: 2021-10-25 | End: 2021-11-23 | Stop reason: SDUPTHER

## 2021-10-25 ASSESSMENT — ENCOUNTER SYMPTOMS
WHEEZING: 0
SHORTNESS OF BREATH: 0

## 2021-10-25 NOTE — PROGRESS NOTES
PHYSICAL EXAMINATION:  [ INSTRUCTIONS:  \"[x]\" Indicates a positive item  \"[]\" Indicates a negative item  -- DELETE ALL ITEMS NOT EXAMINED]  Vital Signs: (As obtained by patient/caregiver or practitioner observation)    Blood pressure-  Heart rate-    Respiratory rate-    Temperature-  Pulse oximetry-     Constitutional: [x] Appears well-developed and well-nourished [x] No apparent distress      [] Abnormal-   Mental status  [x] Alert and awake  [x] Oriented to person/place/time [x]Able to follow commands      Eyes:  EOM    []  Normal  [] Abnormal-  Sclera  [x]  Normal  [] Abnormal -         Discharge []  None visible  [] Abnormal -    HENT:   [x] Normocephalic, atraumatic. [] Abnormal   [] Mouth/Throat: Mucous membranes are moist.     External Ears [] Normal  [] Abnormal-     Neck: [] No visualized mass     Pulmonary/Chest: [x] Respiratory effort normal.  [x] No visualized signs of difficulty breathing or respiratory distress        [] Abnormal-      Musculoskeletal:   [] Normal gait with no signs of ataxia         [x] Normal range of motion of neck        [] Abnormal-       Neurological:        [x] No Facial Asymmetry (Cranial nerve 7 motor function) (limited exam to video visit)          [x] No gaze palsy        [] Abnormal-         Skin:        [x] No significant exanthematous lesions or discoloration noted on facial skin         [] Abnormal-            Psychiatric:       [x] Normal Affect [x] No Hallucinations        [] Abnormal-     Other pertinent observable physical exam findings-     ASSESSMENT/PLAN:  1. Attention deficit hyperactivity disorder (ADHD), unspecified ADHD type  Changed dosing- added XR in the morning and instant in the afternoon    - amphetamine-dextroamphetamine (ADDERALL XR) 20 MG extended release capsule; Take 1 capsule by mouth every morning for 30 days. Dispense: 30 capsule; Refill: 0  - amphetamine-dextroamphetamine (ADDERALL, 10MG,) 10 MG tablet;  Take 1 tablet by mouth daily for 30 days. Dispense: 30 tablet; Refill: 0      No follow-ups on file. Christiano Conklin, was evaluated through a synchronous (real-time) audio-video encounter. The patient (or guardian if applicable) is aware that this is a billable service. Verbal consent to proceed has been obtained within the past 12 months. The visit was conducted pursuant to the emergency declaration under the [address] [place] [address] and the [employer] and [employer] General Act. Patient identification was verified, and a caregiver was present when appropriate. The patient was located in a state where the provider was credentialed to provide care. Total time spent on this encounter: Not billed by time    --Charyl Heimlich, APRN - CNP on 10/25/2021 at 8:00 AM    An electronic signature was used to authenticate this note.

## 2021-11-23 DIAGNOSIS — F90.9 ATTENTION DEFICIT HYPERACTIVITY DISORDER (ADHD), UNSPECIFIED ADHD TYPE: ICD-10-CM

## 2021-11-23 RX ORDER — DEXTROAMPHETAMINE SACCHARATE, AMPHETAMINE ASPARTATE, DEXTROAMPHETAMINE SULFATE AND AMPHETAMINE SULFATE 2.5; 2.5; 2.5; 2.5 MG/1; MG/1; MG/1; MG/1
10 TABLET ORAL DAILY
Qty: 30 TABLET | Refills: 0 | Status: SHIPPED | OUTPATIENT
Start: 2021-11-23 | End: 2021-12-20 | Stop reason: SDUPTHER

## 2021-11-23 RX ORDER — DEXTROAMPHETAMINE SACCHARATE, AMPHETAMINE ASPARTATE MONOHYDRATE, DEXTROAMPHETAMINE SULFATE AND AMPHETAMINE SULFATE 5; 5; 5; 5 MG/1; MG/1; MG/1; MG/1
20 CAPSULE, EXTENDED RELEASE ORAL EVERY MORNING
Qty: 30 CAPSULE | Refills: 0 | Status: SHIPPED | OUTPATIENT
Start: 2021-11-23 | End: 2021-12-20 | Stop reason: SDUPTHER

## 2021-12-20 DIAGNOSIS — F90.9 ATTENTION DEFICIT HYPERACTIVITY DISORDER (ADHD), UNSPECIFIED ADHD TYPE: ICD-10-CM

## 2021-12-21 RX ORDER — DEXTROAMPHETAMINE SACCHARATE, AMPHETAMINE ASPARTATE MONOHYDRATE, DEXTROAMPHETAMINE SULFATE AND AMPHETAMINE SULFATE 5; 5; 5; 5 MG/1; MG/1; MG/1; MG/1
20 CAPSULE, EXTENDED RELEASE ORAL EVERY MORNING
Qty: 30 CAPSULE | Refills: 0 | Status: SHIPPED | OUTPATIENT
Start: 2021-12-21 | End: 2022-01-25 | Stop reason: SDUPTHER

## 2022-01-25 DIAGNOSIS — F90.9 ATTENTION DEFICIT HYPERACTIVITY DISORDER (ADHD), UNSPECIFIED ADHD TYPE: ICD-10-CM

## 2022-01-25 RX ORDER — DEXTROAMPHETAMINE SACCHARATE, AMPHETAMINE ASPARTATE, DEXTROAMPHETAMINE SULFATE AND AMPHETAMINE SULFATE 2.5; 2.5; 2.5; 2.5 MG/1; MG/1; MG/1; MG/1
10 TABLET ORAL DAILY
Qty: 30 TABLET | Refills: 0 | Status: SHIPPED | OUTPATIENT
Start: 2022-01-25 | End: 2022-02-24

## 2022-01-25 RX ORDER — DEXTROAMPHETAMINE SACCHARATE, AMPHETAMINE ASPARTATE MONOHYDRATE, DEXTROAMPHETAMINE SULFATE AND AMPHETAMINE SULFATE 5; 5; 5; 5 MG/1; MG/1; MG/1; MG/1
20 CAPSULE, EXTENDED RELEASE ORAL EVERY MORNING
Qty: 30 CAPSULE | Refills: 0 | Status: CANCELLED | OUTPATIENT
Start: 2022-01-25 | End: 2022-02-24

## 2022-01-25 RX ORDER — DEXTROAMPHETAMINE SACCHARATE, AMPHETAMINE ASPARTATE MONOHYDRATE, DEXTROAMPHETAMINE SULFATE AND AMPHETAMINE SULFATE 5; 5; 5; 5 MG/1; MG/1; MG/1; MG/1
20 CAPSULE, EXTENDED RELEASE ORAL EVERY MORNING
Qty: 30 CAPSULE | Refills: 0 | Status: SHIPPED | OUTPATIENT
Start: 2022-01-25 | End: 2022-02-24

## 2022-01-25 NOTE — TELEPHONE ENCOUNTER
Last Office Visit  -  10/25/21  Next Office Visit  -  n/a     Last Filled  -  12/21/21  Last UDS -  n/a  Contract -  N/a

## 2024-03-13 ENCOUNTER — APPOINTMENT (OUTPATIENT)
Dept: GENERAL RADIOLOGY | Age: 39
End: 2024-03-13
Payer: OTHER MISCELLANEOUS

## 2024-03-13 ENCOUNTER — APPOINTMENT (OUTPATIENT)
Dept: CT IMAGING | Age: 39
End: 2024-03-13
Payer: OTHER MISCELLANEOUS

## 2024-03-13 ENCOUNTER — HOSPITAL ENCOUNTER (EMERGENCY)
Age: 39
Discharge: HOME | End: 2024-03-13
Attending: STUDENT IN AN ORGANIZED HEALTH CARE EDUCATION/TRAINING PROGRAM
Payer: OTHER MISCELLANEOUS

## 2024-03-13 VITALS
OXYGEN SATURATION: 100 % | WEIGHT: 290 LBS | TEMPERATURE: 97.8 F | DIASTOLIC BLOOD PRESSURE: 77 MMHG | HEART RATE: 86 BPM | HEIGHT: 67 IN | BODY MASS INDEX: 45.52 KG/M2 | RESPIRATION RATE: 16 BRPM | SYSTOLIC BLOOD PRESSURE: 127 MMHG

## 2024-03-13 DIAGNOSIS — S82.141A CLOSED FRACTURE OF RIGHT TIBIAL PLATEAU, INITIAL ENCOUNTER: Primary | ICD-10-CM
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
## 2024-03-13 DIAGNOSIS — S62.346A CLOSED NONDISPLACED FRACTURE OF BASE OF FIFTH METACARPAL BONE OF RIGHT HAND, INITIAL ENCOUNTER: ICD-10-CM

## 2024-03-13 PROCEDURE — 71250 CT THORAX DX C-: CPT

## 2024-03-13 PROCEDURE — 90715 TDAP VACCINE 7 YRS/> IM: CPT | Performed by: STUDENT IN AN ORGANIZED HEALTH CARE EDUCATION/TRAINING PROGRAM

## 2024-03-13 PROCEDURE — 73700 CT LOWER EXTREMITY W/O DYE: CPT

## 2024-03-13 PROCEDURE — 6360000002 HC RX W HCPCS: Performed by: EMERGENCY MEDICINE

## 2024-03-13 PROCEDURE — 96374 THER/PROPH/DIAG INJ IV PUSH: CPT | Performed by: STUDENT IN AN ORGANIZED HEALTH CARE EDUCATION/TRAINING PROGRAM

## 2024-03-13 PROCEDURE — 73110 X-RAY EXAM OF WRIST: CPT

## 2024-03-13 PROCEDURE — 6370000000 HC RX 637 (ALT 250 FOR IP): Performed by: EMERGENCY MEDICINE

## 2024-03-13 PROCEDURE — 72125 CT NECK SPINE W/O DYE: CPT

## 2024-03-13 PROCEDURE — 6360000002 HC RX W HCPCS: Performed by: STUDENT IN AN ORGANIZED HEALTH CARE EDUCATION/TRAINING PROGRAM

## 2024-03-13 PROCEDURE — 96376 TX/PRO/DX INJ SAME DRUG ADON: CPT | Performed by: STUDENT IN AN ORGANIZED HEALTH CARE EDUCATION/TRAINING PROGRAM

## 2024-03-13 PROCEDURE — 99284 EMERGENCY DEPT VISIT MOD MDM: CPT | Performed by: STUDENT IN AN ORGANIZED HEALTH CARE EDUCATION/TRAINING PROGRAM

## 2024-03-13 PROCEDURE — 70450 CT HEAD/BRAIN W/O DYE: CPT

## 2024-03-13 PROCEDURE — 73562 X-RAY EXAM OF KNEE 3: CPT

## 2024-03-13 PROCEDURE — 29125 APPL SHORT ARM SPLINT STATIC: CPT | Performed by: STUDENT IN AN ORGANIZED HEALTH CARE EDUCATION/TRAINING PROGRAM

## 2024-03-13 PROCEDURE — 90471 IMMUNIZATION ADMIN: CPT | Performed by: STUDENT IN AN ORGANIZED HEALTH CARE EDUCATION/TRAINING PROGRAM

## 2024-03-13 RX ORDER — OXYCODONE HYDROCHLORIDE 5 MG/1
5 TABLET ORAL ONCE
Status: COMPLETED | OUTPATIENT
Start: 2024-03-13 | End: 2024-03-13

## 2024-03-13 RX ORDER — IBUPROFEN 600 MG/1
600 TABLET ORAL EVERY 8 HOURS PRN
Qty: 15 TABLET | Refills: 0 | Status: SHIPPED | OUTPATIENT
Start: 2024-03-13 | End: 2024-03-18

## 2024-03-13 RX ORDER — MORPHINE SULFATE 4 MG/ML
4 INJECTION, SOLUTION INTRAMUSCULAR; INTRAVENOUS
Status: COMPLETED | OUTPATIENT
Start: 2024-03-13 | End: 2024-03-13

## 2024-03-13 RX ORDER — OXYCODONE HYDROCHLORIDE AND ACETAMINOPHEN 5; 325 MG/1; MG/1
1 TABLET ORAL EVERY 8 HOURS PRN
Qty: 10 TABLET | Refills: 0 | Status: SHIPPED | OUTPATIENT
Start: 2024-03-13 | End: 2024-03-16

## 2024-03-13 RX ADMIN — MORPHINE SULFATE 4 MG: 4 INJECTION, SOLUTION INTRAMUSCULAR; INTRAVENOUS at 08:39

## 2024-03-13 RX ADMIN — MORPHINE SULFATE 4 MG: 4 INJECTION, SOLUTION INTRAMUSCULAR; INTRAVENOUS at 06:26

## 2024-03-13 RX ADMIN — TETANUS TOXOID, REDUCED DIPHTHERIA TOXOID AND ACELLULAR PERTUSSIS VACCINE, ADSORBED 0.5 ML: 5; 2.5; 8; 8; 2.5 SUSPENSION INTRAMUSCULAR at 06:27

## 2024-03-13 RX ADMIN — OXYCODONE 5 MG: 5 TABLET ORAL at 11:04

## 2024-03-13 ASSESSMENT — LIFESTYLE VARIABLES
HOW OFTEN DO YOU HAVE A DRINK CONTAINING ALCOHOL: NEVER
HOW MANY STANDARD DRINKS CONTAINING ALCOHOL DO YOU HAVE ON A TYPICAL DAY: PATIENT DOES NOT DRINK

## 2024-03-13 ASSESSMENT — PAIN DESCRIPTION - ORIENTATION: ORIENTATION: RIGHT

## 2024-03-13 ASSESSMENT — PAIN - FUNCTIONAL ASSESSMENT: PAIN_FUNCTIONAL_ASSESSMENT: 0-10

## 2024-03-13 ASSESSMENT — PAIN SCALES - GENERAL
PAINLEVEL_OUTOF10: 8
PAINLEVEL_OUTOF10: 8

## 2024-03-13 ASSESSMENT — PAIN DESCRIPTION - LOCATION: LOCATION: OTHER (COMMENT)
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
## 2024-03-13 NOTE — ED NOTES
0918 - Perfect Serve sent to Rishabh Perez for Orthopedic consult    0982 - Rishabh came down to the ER to complete the consult

## 2024-03-13 NOTE — CONSULTS
maintain nonweightbearing status with utilization of immobilizer on the right leg recommend utilization of crutches as patient is young and is adapt to use of crutches from previous injuries.  3) recommend utilization of volar splint/brace on wrist for protection with utilization of crutches to ensure stabilization of fracture in the wrist.  4) continue with pain control  5) patient can remove the immobilizer when performing hygiene/showering would recommend utilization of shower chair range of motion as tolerated with an sitting in a protected fashion to perform hygiene.  6) would recommend patient remain off work until further evaluation with Dr. Cote in 1 week's time      Thank you for the opportunity to consult on this patient.    Electronically signed by CARLA Jiménez on 3/13/2024 at 9:53 AM

## 2024-03-13 NOTE — DISCHARGE INSTRUCTIONS
Non weight bearing on the right leg with use of immobilizer and crutches   Follow up with Dr. Cote in 1 week for repeat evaluation.   Can remove brace to shower or cleanse the area bending is allowed for hygiene purposes as long as there is no weight bearing recommend shower chair   Volar brace on wrist for 4th metacarpal fracture

## 2024-10-21 ENCOUNTER — HOSPITAL ENCOUNTER (EMERGENCY)
Age: 39
Discharge: HOME | End: 2024-10-21
Attending: STUDENT IN AN ORGANIZED HEALTH CARE EDUCATION/TRAINING PROGRAM

## 2024-10-21 ENCOUNTER — APPOINTMENT (OUTPATIENT)
Dept: GENERAL RADIOLOGY | Age: 39
End: 2024-10-21

## 2024-10-21 VITALS
BODY MASS INDEX: 43.15 KG/M2 | SYSTOLIC BLOOD PRESSURE: 141 MMHG | HEIGHT: 70 IN | DIASTOLIC BLOOD PRESSURE: 89 MMHG | TEMPERATURE: 97.8 F | WEIGHT: 301.4 LBS | OXYGEN SATURATION: 96 % | HEART RATE: 87 BPM | RESPIRATION RATE: 16 BRPM

## 2024-10-21 DIAGNOSIS — M77.8 RIGHT ELBOW TENDONITIS: Primary | ICD-10-CM

## 2024-10-21 DIAGNOSIS — J06.9 ACUTE UPPER RESPIRATORY INFECTION: ICD-10-CM

## 2024-10-21 LAB
FLUAV RNA RESP QL NAA+PROBE: NOT DETECTED
FLUBV RNA RESP QL NAA+PROBE: NOT DETECTED
SARS-COV-2 RNA RESP QL NAA+PROBE: NOT DETECTED

## 2024-10-21 PROCEDURE — 73080 X-RAY EXAM OF ELBOW: CPT

## 2024-10-21 PROCEDURE — 87636 SARSCOV2 & INF A&B AMP PRB: CPT

## 2024-10-21 PROCEDURE — 6360000002 HC RX W HCPCS

## 2024-10-21 PROCEDURE — 99284 EMERGENCY DEPT VISIT MOD MDM: CPT

## 2024-10-21 PROCEDURE — 96372 THER/PROPH/DIAG INJ SC/IM: CPT

## 2024-10-21 RX ORDER — KETOROLAC TROMETHAMINE 15 MG/ML
15 INJECTION, SOLUTION INTRAMUSCULAR; INTRAVENOUS ONCE
Status: COMPLETED | OUTPATIENT
Start: 2024-10-21 | End: 2024-10-21

## 2024-10-21 RX ADMIN — KETOROLAC TROMETHAMINE 15 MG: 15 INJECTION, SOLUTION INTRAMUSCULAR; INTRAVENOUS at 12:45

## 2024-10-21 ASSESSMENT — PAIN DESCRIPTION - LOCATION: LOCATION: HEAD

## 2024-10-21 ASSESSMENT — PAIN SCALES - GENERAL: PAINLEVEL_OUTOF10: 8

## 2024-10-21 ASSESSMENT — PAIN DESCRIPTION - DESCRIPTORS: DESCRIPTORS: SHOOTING

## 2024-10-21 ASSESSMENT — PAIN - FUNCTIONAL ASSESSMENT: PAIN_FUNCTIONAL_ASSESSMENT: 0-10

## 2024-10-21 NOTE — ED PROVIDER NOTES
[facility] ED  EMERGENCY DEPARTMENT ENCOUNTER        Pt Name: Chrales Benson  MRN: [id number]  Birthdate 1985  Date of evaluation: 10/21/2024  Provider: CARLA Landry  PCP: Huma Cardoso APRN - CNP  Note Started: 12:13 PM EDT 10/21/24       I have seen and evaluated this patient with my supervising physician Murray Ball MD.      CHIEF COMPLAINT       Chief Complaint   Patient presents with    Elbow Pain     Right 2 weeks    Fever     Intermittent x 1 week    Headache       HISTORY OF PRESENT ILLNESS: 1 or more Elements     History From: Patient    Limitations to history : None    Social Determinants Significantly Affecting Health : None    Chief Complaint: Elbow pain, fever, headache    Charles Benson is a 38 y.o. male who presents to the emergency department for right elbow pain.  Patient states that this started about 2 weeks ago.  Denies any injury or trauma to the area.  States that it is worse with with moving and when he is at work.  Has tried ibuprofen with some relief of symptoms.  Admits to some tingling in his fingertips.  Patient also complains of fever and headache.  Patient states that for the last week he has been having intermittent fevers with associated headache, nausea, body aches, cough.  Denies any recent sick contacts.  Denies abdominal pain, diarrhea, vomiting, chest pain, shortness of breath.  Does have a history of migraines.  Has tried taking over-the-counter cold and flu medicine without relief.    Nursing Notes were all reviewed and agreed with or any disagreements were addressed in the HPI.    REVIEW OF SYSTEMS :      Review of Systems    Positives and Pertinent negatives as per HPI.     SURGICAL HISTORY     Past Surgical History:   Procedure Laterality Date    APPENDECTOMY      TYMPANOSTOMY TUBE PLACEMENT         CURRENTMEDICATIONS       Discharge Medication List as of 10/21/2024  2:00 PM        CONTINUE these medications

## 2024-10-21 NOTE — DISCHARGE INSTRUCTIONS
Apply a compressive ACE bandage. Rest and elevate the affected painful area.  Apply cold compresses intermittently as needed.  As pain recedes, begin normal activities slowly as tolerated.  Please follow up with your primary care provider. You can take over the counter tylenol (1000 mg up to three times a day) or ibuprofen (600 mg up to 4 times a day) as needed for pain relief. Return to this department if you develop new or worsening symptoms including increased pain, persistent fever, shortness of breath

## 2024-10-21 NOTE — ED PROVIDER NOTES
I independently performed a history and physical on Charles Benson.     I have discussed the case with the KIMMY/resident at 1300 and approve / take responsibility for the initial management plan and anticipated disposition as documented below.     In summary the patient presents with several plaints including atraumatic right elbow pain as well as about 4 days of URI symptoms with headache.  My evaluation the patient is afebrile hemodynamically stable in no acute distress.  He is clinically euvolemic nontoxic-appearing.  His breath sounds are clear his abdomen is soft nontender nondistended extremities are warm and well-perfused.  Focused examination of the right elbow there is no warmth redness or decreased range of motion there are no deformities.  I have no concern for a septic arthritis in favor this is a overuse injury low risk musculoskeletal condition amenable to outpatient follow-up orthopedics.  The extremity is distally neurovascular intact.  Regarding his headache and URI symptoms.  There are no red flags of no concern for encephalitis or meningitis.  His neurologic exam is below and is benign.  Given his illness has lasted 3 to 4 days I have low suspicion for a bacterial secondary infection will defer antibiotics and the patient himself agrees that his condition is slowly improving this regard.  His workup is reviewed COVID and influenza screens are benign x-ray imaging is reassuring patient discharged in stable condition.    Charles Benson's NIH Stroke Scale Level of Consciousness:  0 - alert; keenly responsive    LOC Questions:  0 - answers both questions correctly    LOC Commands:  0 - performs both tasks correctly    Best Gaze:  0 - normal    Visual Fields:  0 - no visual loss    Facial Palsy:  0 - normal symmetric movement    Motor-Arm-Left:  0 - no drift, limb holds 90 (or 45) degrees for full 10 seconds    Motor-Leg-Left:  0 - no drift; leg holds 30 degree position for full 5